# Patient Record
Sex: FEMALE | Race: WHITE | NOT HISPANIC OR LATINO
[De-identification: names, ages, dates, MRNs, and addresses within clinical notes are randomized per-mention and may not be internally consistent; named-entity substitution may affect disease eponyms.]

---

## 2017-01-12 ENCOUNTER — MEDICATION RENEWAL (OUTPATIENT)
Age: 82
End: 2017-01-12

## 2017-01-23 ENCOUNTER — MEDICATION RENEWAL (OUTPATIENT)
Age: 82
End: 2017-01-23

## 2017-01-26 ENCOUNTER — MEDICATION RENEWAL (OUTPATIENT)
Age: 82
End: 2017-01-26

## 2017-01-27 ENCOUNTER — MEDICATION RENEWAL (OUTPATIENT)
Age: 82
End: 2017-01-27

## 2017-03-07 ENCOUNTER — MEDICATION RENEWAL (OUTPATIENT)
Age: 82
End: 2017-03-07

## 2017-05-25 ENCOUNTER — APPOINTMENT (OUTPATIENT)
Dept: INTERNAL MEDICINE | Facility: CLINIC | Age: 82
End: 2017-05-25

## 2017-05-25 VITALS
BODY MASS INDEX: 29.04 KG/M2 | OXYGEN SATURATION: 94 % | TEMPERATURE: 98.3 F | HEIGHT: 63.75 IN | HEART RATE: 76 BPM | WEIGHT: 168 LBS

## 2017-05-25 VITALS — SYSTOLIC BLOOD PRESSURE: 120 MMHG | DIASTOLIC BLOOD PRESSURE: 60 MMHG

## 2017-06-01 LAB
25(OH)D3 SERPL-MCNC: 30.6 NG/ML
ALBUMIN SERPL ELPH-MCNC: 4.3 G/DL
ALP BLD-CCNC: 60 U/L
ALT SERPL-CCNC: 15 U/L
ANION GAP SERPL CALC-SCNC: 18 MMOL/L
AST SERPL-CCNC: 21 U/L
BASOPHILS # BLD AUTO: 0.02 K/UL
BASOPHILS NFR BLD AUTO: 0.3 %
BILIRUB SERPL-MCNC: 0.3 MG/DL
BUN SERPL-MCNC: 35 MG/DL
CALCIUM SERPL-MCNC: 10.6 MG/DL
CHLORIDE SERPL-SCNC: 103 MMOL/L
CHOLEST SERPL-MCNC: 192 MG/DL
CHOLEST/HDLC SERPL: 3.6 RATIO
CO2 SERPL-SCNC: 18 MMOL/L
CREAT SERPL-MCNC: 1.63 MG/DL
EOSINOPHIL # BLD AUTO: 0.25 K/UL
EOSINOPHIL NFR BLD AUTO: 4.3 %
GLUCOSE SERPL-MCNC: 105 MG/DL
HBA1C MFR BLD HPLC: 6.2 %
HCT VFR BLD CALC: 38.6 %
HDLC SERPL-MCNC: 53 MG/DL
HGB BLD-MCNC: 11.6 G/DL
IMM GRANULOCYTES NFR BLD AUTO: 0.2 %
LDLC SERPL CALC-MCNC: 99 MG/DL
LYMPHOCYTES # BLD AUTO: 2.19 K/UL
LYMPHOCYTES NFR BLD AUTO: 37.7 %
MAN DIFF?: NORMAL
MCHC RBC-ENTMCNC: 26.9 PG
MCHC RBC-ENTMCNC: 30.1 GM/DL
MCV RBC AUTO: 89.4 FL
MONOCYTES # BLD AUTO: 0.64 K/UL
MONOCYTES NFR BLD AUTO: 11 %
NEUTROPHILS # BLD AUTO: 2.7 K/UL
NEUTROPHILS NFR BLD AUTO: 46.5 %
PLATELET # BLD AUTO: 249 K/UL
POTASSIUM SERPL-SCNC: 4.9 MMOL/L
PROT SERPL-MCNC: 6.9 G/DL
RBC # BLD: 4.32 M/UL
RBC # FLD: 15 %
SODIUM SERPL-SCNC: 139 MMOL/L
TRIGL SERPL-MCNC: 200 MG/DL
WBC # FLD AUTO: 5.81 K/UL

## 2017-06-27 ENCOUNTER — APPOINTMENT (OUTPATIENT)
Dept: ENDOCRINOLOGY | Facility: CLINIC | Age: 82
End: 2017-06-27

## 2017-06-27 VITALS
BODY MASS INDEX: 28.69 KG/M2 | OXYGEN SATURATION: 94 % | SYSTOLIC BLOOD PRESSURE: 120 MMHG | DIASTOLIC BLOOD PRESSURE: 64 MMHG | HEIGHT: 63.75 IN | HEART RATE: 77 BPM | WEIGHT: 166 LBS

## 2017-06-27 RX ORDER — DENOSUMAB 60 MG/ML
60 INJECTION SUBCUTANEOUS
Qty: 1 | Refills: 0 | Status: COMPLETED | OUTPATIENT
Start: 2017-06-27

## 2017-06-27 RX ADMIN — DENOSUMAB 0 MG/ML: 60 INJECTION SUBCUTANEOUS at 00:00

## 2017-07-06 ENCOUNTER — RX RENEWAL (OUTPATIENT)
Age: 82
End: 2017-07-06

## 2017-07-17 ENCOUNTER — MESSAGE (OUTPATIENT)
Age: 82
End: 2017-07-17

## 2017-07-17 ENCOUNTER — MEDICATION RENEWAL (OUTPATIENT)
Age: 82
End: 2017-07-17

## 2017-07-25 ENCOUNTER — FORM ENCOUNTER (OUTPATIENT)
Age: 82
End: 2017-07-25

## 2017-07-26 ENCOUNTER — APPOINTMENT (OUTPATIENT)
Dept: ULTRASOUND IMAGING | Facility: IMAGING CENTER | Age: 82
End: 2017-07-26

## 2017-07-26 ENCOUNTER — OUTPATIENT (OUTPATIENT)
Dept: OUTPATIENT SERVICES | Facility: HOSPITAL | Age: 82
LOS: 1 days | End: 2017-07-26
Payer: MEDICARE

## 2017-07-26 DIAGNOSIS — C73 MALIGNANT NEOPLASM OF THYROID GLAND: ICD-10-CM

## 2017-07-26 PROCEDURE — 76536 US EXAM OF HEAD AND NECK: CPT

## 2017-08-17 ENCOUNTER — RX RENEWAL (OUTPATIENT)
Age: 82
End: 2017-08-17

## 2017-08-30 ENCOUNTER — MEDICATION RENEWAL (OUTPATIENT)
Age: 82
End: 2017-08-30

## 2017-09-05 ENCOUNTER — MEDICATION RENEWAL (OUTPATIENT)
Age: 82
End: 2017-09-05

## 2017-09-14 ENCOUNTER — RESULT REVIEW (OUTPATIENT)
Age: 82
End: 2017-09-14

## 2017-09-23 ENCOUNTER — RX RENEWAL (OUTPATIENT)
Age: 82
End: 2017-09-23

## 2017-10-27 ENCOUNTER — APPOINTMENT (OUTPATIENT)
Dept: INTERNAL MEDICINE | Facility: CLINIC | Age: 82
End: 2017-10-27
Payer: MEDICARE

## 2017-10-27 DIAGNOSIS — Z92.29 PERSONAL HISTORY OF OTHER DRUG THERAPY: ICD-10-CM

## 2017-10-27 DIAGNOSIS — Z87.898 PERSONAL HISTORY OF OTHER SPECIFIED CONDITIONS: ICD-10-CM

## 2017-10-27 PROCEDURE — G0008: CPT

## 2017-10-27 PROCEDURE — 90662 IIV NO PRSV INCREASED AG IM: CPT

## 2017-11-14 ENCOUNTER — APPOINTMENT (OUTPATIENT)
Dept: INTERNAL MEDICINE | Facility: CLINIC | Age: 82
End: 2017-11-14
Payer: MEDICARE

## 2017-11-14 VITALS
TEMPERATURE: 98.1 F | HEART RATE: 79 BPM | WEIGHT: 166 LBS | DIASTOLIC BLOOD PRESSURE: 70 MMHG | SYSTOLIC BLOOD PRESSURE: 122 MMHG | OXYGEN SATURATION: 95 % | HEIGHT: 63.75 IN | BODY MASS INDEX: 28.69 KG/M2

## 2017-11-14 PROCEDURE — G0009: CPT

## 2017-11-14 PROCEDURE — 36415 COLL VENOUS BLD VENIPUNCTURE: CPT

## 2017-11-14 PROCEDURE — 99214 OFFICE O/P EST MOD 30 MIN: CPT | Mod: 25

## 2017-11-14 PROCEDURE — 90670 PCV13 VACCINE IM: CPT

## 2017-11-30 VITALS — DIASTOLIC BLOOD PRESSURE: 55 MMHG | SYSTOLIC BLOOD PRESSURE: 132 MMHG

## 2017-11-30 LAB
ALBUMIN SERPL ELPH-MCNC: 4 G/DL
ALP BLD-CCNC: 63 U/L
ALT SERPL-CCNC: 12 U/L
ANION GAP SERPL CALC-SCNC: 14 MMOL/L
AST SERPL-CCNC: 16 U/L
BILIRUB SERPL-MCNC: 0.3 MG/DL
BUN SERPL-MCNC: 38 MG/DL
CALCIUM SERPL-MCNC: 10.8 MG/DL
CHLORIDE SERPL-SCNC: 104 MMOL/L
CHOLEST SERPL-MCNC: 190 MG/DL
CHOLEST/HDLC SERPL: 4.5 RATIO
CO2 SERPL-SCNC: 22 MMOL/L
CREAT SERPL-MCNC: 1.8 MG/DL
GLUCOSE SERPL-MCNC: 114 MG/DL
HBA1C MFR BLD HPLC: 5.8 %
HDLC SERPL-MCNC: 42 MG/DL
LDLC SERPL CALC-MCNC: 70 MG/DL
POTASSIUM SERPL-SCNC: 4.6 MMOL/L
PROT SERPL-MCNC: 7.2 G/DL
SODIUM SERPL-SCNC: 140 MMOL/L
TRIGL SERPL-MCNC: 390 MG/DL

## 2017-12-06 ENCOUNTER — MEDICATION RENEWAL (OUTPATIENT)
Age: 82
End: 2017-12-06

## 2017-12-08 ENCOUNTER — MEDICATION RENEWAL (OUTPATIENT)
Age: 82
End: 2017-12-08

## 2017-12-12 ENCOUNTER — MEDICATION RENEWAL (OUTPATIENT)
Age: 82
End: 2017-12-12

## 2017-12-13 ENCOUNTER — FORM ENCOUNTER (OUTPATIENT)
Age: 82
End: 2017-12-13

## 2017-12-14 ENCOUNTER — OUTPATIENT (OUTPATIENT)
Dept: OUTPATIENT SERVICES | Facility: HOSPITAL | Age: 82
LOS: 1 days | End: 2017-12-14
Payer: MEDICARE

## 2017-12-14 ENCOUNTER — APPOINTMENT (OUTPATIENT)
Dept: ULTRASOUND IMAGING | Facility: CLINIC | Age: 82
End: 2017-12-14
Payer: MEDICARE

## 2017-12-14 DIAGNOSIS — Z00.8 ENCOUNTER FOR OTHER GENERAL EXAMINATION: ICD-10-CM

## 2017-12-14 DIAGNOSIS — E04.2 NONTOXIC MULTINODULAR GOITER: ICD-10-CM

## 2017-12-14 PROCEDURE — 76536 US EXAM OF HEAD AND NECK: CPT

## 2017-12-14 PROCEDURE — 76536 US EXAM OF HEAD AND NECK: CPT | Mod: 26

## 2017-12-19 ENCOUNTER — RX RENEWAL (OUTPATIENT)
Age: 82
End: 2017-12-19

## 2017-12-28 ENCOUNTER — LABORATORY RESULT (OUTPATIENT)
Age: 82
End: 2017-12-28

## 2017-12-28 ENCOUNTER — APPOINTMENT (OUTPATIENT)
Dept: ENDOCRINOLOGY | Facility: CLINIC | Age: 82
End: 2017-12-28
Payer: MEDICARE

## 2017-12-28 VITALS
DIASTOLIC BLOOD PRESSURE: 70 MMHG | OXYGEN SATURATION: 95 % | WEIGHT: 163 LBS | HEIGHT: 63.75 IN | SYSTOLIC BLOOD PRESSURE: 130 MMHG | HEART RATE: 84 BPM | BODY MASS INDEX: 28.17 KG/M2

## 2017-12-28 PROCEDURE — 96372 THER/PROPH/DIAG INJ SC/IM: CPT

## 2017-12-28 PROCEDURE — 99214 OFFICE O/P EST MOD 30 MIN: CPT | Mod: 25

## 2017-12-28 RX ORDER — DENOSUMAB 60 MG/ML
60 INJECTION SUBCUTANEOUS
Qty: 0 | Refills: 0 | Status: COMPLETED | OUTPATIENT
Start: 2017-12-28

## 2017-12-28 RX ADMIN — DENOSUMAB 0 MG/ML: 60 INJECTION SUBCUTANEOUS at 00:00

## 2017-12-29 LAB
25(OH)D3 SERPL-MCNC: 41.3 NG/ML
ALBUMIN SERPL ELPH-MCNC: 4.2 G/DL
ALP BLD-CCNC: 66 U/L
ALT SERPL-CCNC: 12 U/L
ANION GAP SERPL CALC-SCNC: 14 MMOL/L
AST SERPL-CCNC: 14 U/L
BILIRUB SERPL-MCNC: 0.2 MG/DL
BUN SERPL-MCNC: 43 MG/DL
CALCIUM SERPL-MCNC: 11 MG/DL
CALCIUM SERPL-MCNC: 11 MG/DL
CHLORIDE SERPL-SCNC: 101 MMOL/L
CO2 SERPL-SCNC: 25 MMOL/L
CREAT SERPL-MCNC: 1.59 MG/DL
GLUCOSE SERPL-MCNC: 88 MG/DL
PARATHYROID HORMONE INTACT: 147 PG/ML
POTASSIUM SERPL-SCNC: 4.6 MMOL/L
PROT SERPL-MCNC: 7.1 G/DL
SODIUM SERPL-SCNC: 140 MMOL/L

## 2018-01-02 ENCOUNTER — RX RENEWAL (OUTPATIENT)
Age: 83
End: 2018-01-02

## 2018-01-03 LAB
T3RU NFR SERPL: 1 INDEX
T4 SERPL-MCNC: 8.9 UG/DL
THYROGLOB AB SERPL-ACNC: <20 IU/ML
THYROGLOB SERPL-MCNC: 6.97 NG/ML
TSH SERPL-ACNC: 2.4 UIU/ML

## 2018-04-17 ENCOUNTER — RX RENEWAL (OUTPATIENT)
Age: 83
End: 2018-04-17

## 2018-04-19 ENCOUNTER — MEDICATION RENEWAL (OUTPATIENT)
Age: 83
End: 2018-04-19

## 2018-06-19 ENCOUNTER — MEDICATION RENEWAL (OUTPATIENT)
Age: 83
End: 2018-06-19

## 2018-07-10 ENCOUNTER — APPOINTMENT (OUTPATIENT)
Dept: ENDOCRINOLOGY | Facility: CLINIC | Age: 83
End: 2018-07-10
Payer: MEDICARE

## 2018-07-10 VITALS
SYSTOLIC BLOOD PRESSURE: 128 MMHG | OXYGEN SATURATION: 95 % | BODY MASS INDEX: 27.65 KG/M2 | HEART RATE: 77 BPM | HEIGHT: 63.75 IN | DIASTOLIC BLOOD PRESSURE: 70 MMHG | WEIGHT: 160 LBS

## 2018-07-10 PROCEDURE — 96372 THER/PROPH/DIAG INJ SC/IM: CPT

## 2018-07-10 PROCEDURE — 99214 OFFICE O/P EST MOD 30 MIN: CPT | Mod: 25

## 2018-07-10 RX ORDER — DENOSUMAB 60 MG/ML
60 INJECTION SUBCUTANEOUS
Qty: 0 | Refills: 0 | Status: COMPLETED | OUTPATIENT
Start: 2018-07-10

## 2018-07-10 RX ADMIN — DENOSUMAB 0 MG/ML: 60 INJECTION SUBCUTANEOUS at 00:00

## 2018-07-17 LAB
25(OH)D3 SERPL-MCNC: 39.6 NG/ML
ALBUMIN SERPL ELPH-MCNC: 4.2 G/DL
ALP BLD-CCNC: 64 U/L
ALT SERPL-CCNC: 16 U/L
ANION GAP SERPL CALC-SCNC: 16 MMOL/L
AST SERPL-CCNC: 17 U/L
BILIRUB SERPL-MCNC: 0.2 MG/DL
BUN SERPL-MCNC: 47 MG/DL
CALCIUM SERPL-MCNC: 10.8 MG/DL
CALCIUM SERPL-MCNC: 10.8 MG/DL
CHLORIDE SERPL-SCNC: 102 MMOL/L
CO2 SERPL-SCNC: 21 MMOL/L
CREAT SERPL-MCNC: 1.78 MG/DL
GLUCOSE SERPL-MCNC: 94 MG/DL
PARATHYROID HORMONE INTACT: 134 PG/ML
POTASSIUM SERPL-SCNC: 4.7 MMOL/L
PROT SERPL-MCNC: 6.9 G/DL
SODIUM SERPL-SCNC: 139 MMOL/L

## 2018-08-22 ENCOUNTER — RX RENEWAL (OUTPATIENT)
Age: 83
End: 2018-08-22

## 2018-08-24 ENCOUNTER — APPOINTMENT (OUTPATIENT)
Dept: INTERNAL MEDICINE | Facility: CLINIC | Age: 83
End: 2018-08-24
Payer: MEDICARE

## 2018-08-24 VITALS
OXYGEN SATURATION: 92 % | TEMPERATURE: 97.7 F | HEART RATE: 80 BPM | HEIGHT: 63.25 IN | BODY MASS INDEX: 28.17 KG/M2 | WEIGHT: 161 LBS

## 2018-08-24 PROCEDURE — 90750 HZV VACC RECOMBINANT IM: CPT | Mod: GY

## 2018-08-24 PROCEDURE — 36415 COLL VENOUS BLD VENIPUNCTURE: CPT

## 2018-08-24 PROCEDURE — 90471 IMMUNIZATION ADMIN: CPT | Mod: GY

## 2018-08-24 PROCEDURE — G0439: CPT

## 2018-08-24 PROCEDURE — 93000 ELECTROCARDIOGRAM COMPLETE: CPT | Mod: 59

## 2018-08-25 ENCOUNTER — NON-APPOINTMENT (OUTPATIENT)
Age: 83
End: 2018-08-25

## 2018-08-25 VITALS — SYSTOLIC BLOOD PRESSURE: 120 MMHG | DIASTOLIC BLOOD PRESSURE: 65 MMHG

## 2018-08-25 LAB
APPEARANCE: CLEAR
BACTERIA: NEGATIVE
BASOPHILS # BLD AUTO: 0.03 K/UL
BASOPHILS NFR BLD AUTO: 0.7 %
BILIRUBIN URINE: NEGATIVE
BLOOD URINE: NEGATIVE
CHOLEST SERPL-MCNC: 176 MG/DL
CHOLEST/HDLC SERPL: 3.6 RATIO
COLOR: YELLOW
EOSINOPHIL # BLD AUTO: 0.22 K/UL
EOSINOPHIL NFR BLD AUTO: 5.2 %
GLUCOSE QUALITATIVE U: NEGATIVE MG/DL
HBA1C MFR BLD HPLC: 5.9 %
HCT VFR BLD CALC: 40.8 %
HDLC SERPL-MCNC: 49 MG/DL
HGB BLD-MCNC: 12.1 G/DL
HYALINE CASTS: 0 /LPF
IMM GRANULOCYTES NFR BLD AUTO: 0.2 %
KETONES URINE: NEGATIVE
LDLC SERPL CALC-MCNC: 101 MG/DL
LEUKOCYTE ESTERASE URINE: ABNORMAL
LYMPHOCYTES # BLD AUTO: 1.77 K/UL
LYMPHOCYTES NFR BLD AUTO: 41.9 %
MAN DIFF?: NORMAL
MCHC RBC-ENTMCNC: 27.4 PG
MCHC RBC-ENTMCNC: 29.7 GM/DL
MCV RBC AUTO: 92.5 FL
MICROSCOPIC-UA: NORMAL
MONOCYTES # BLD AUTO: 0.46 K/UL
MONOCYTES NFR BLD AUTO: 10.9 %
NEUTROPHILS # BLD AUTO: 1.73 K/UL
NEUTROPHILS NFR BLD AUTO: 41.1 %
NITRITE URINE: NEGATIVE
PH URINE: 6
PLATELET # BLD AUTO: 225 K/UL
PROTEIN URINE: NEGATIVE MG/DL
RBC # BLD: 4.41 M/UL
RBC # FLD: 15 %
RED BLOOD CELLS URINE: 0 /HPF
SPECIFIC GRAVITY URINE: 1.01
SQUAMOUS EPITHELIAL CELLS: 1 /HPF
T4 FREE SERPL-MCNC: 1.7 NG/DL
TRIGL SERPL-MCNC: 132 MG/DL
TSH SERPL-ACNC: 0.9 UIU/ML
UROBILINOGEN URINE: NEGATIVE MG/DL
WBC # FLD AUTO: 4.22 K/UL
WHITE BLOOD CELLS URINE: 6 /HPF

## 2018-08-25 NOTE — ASSESSMENT
[FreeTextEntry1] : The blood pressure is very good.  Discussed diet and exercise.\par \par Check lipids on Simvastatin.\par \par Check a HgBA1c- GIT.\par \par Check TFTs on Levothyroxine.\par \par Monitor renal function.\par \par She requests PT for her balance and conditioning.\par \par She declines a mammogram- ok given her age.  Also defer a colonoscopy.\par \par She sees her endocrinologist and osteoporosis managed there.\par \par She is doing well from a psychiatric standpoint.  Monitor.  \par \par S/p Zostavax, Prevnar, Pneumovax.  Shingrix today #1.

## 2018-08-25 NOTE — HEALTH RISK ASSESSMENT
[No falls in past year] : Patient reported no falls in the past year [0] : 2) Feeling down, depressed, or hopeless: Not at all (0) [Patient declined mammogram] : Patient declined mammogram [Patient declined colonoscopy] : Patient declined colonoscopy [Fully functional (bathing, dressing, toileting, transferring, walking, feeding)] : Fully functional (bathing, dressing, toileting, transferring, walking, feeding) [Fully functional (using the telephone, shopping, preparing meals, housekeeping, doing laundry, using] : Fully functional and needs no help or supervision to perform IADLs (using the telephone, shopping, preparing meals, housekeeping, doing laundry, using transportation, managing medications and managing finances) [] : No [[id number]] : 0 [Change in mental status noted] : No change in mental status noted [Reports changes in hearing] : Reports no changes in hearing [Reports changes in vision] : Reports no changes in vision [Reports changes in dental health] : Reports no changes in dental health

## 2018-08-25 NOTE — DATA REVIEWED
[FreeTextEntry1] : EKG:  NSR 60, PRWP, Q waves in 111, aVF, similar to EKGs over the last few years.

## 2018-08-25 NOTE — HISTORY OF PRESENT ILLNESS
[FreeTextEntry1] : The patient is here for a routine visit.  [de-identified] : The patient has a healthy diet.  She doesn't exercise.  No chest pain or dyspnea.\par \par She hasn't needed to see Dr. Royal and she feels well from a psychiatric standpoint.\par \par She saw Dr. Perlman recently for a sinusitis.\par \par She sees Dr. Mercado, last 7/18 for the osteoporosis (on Prolia) and thyroid.

## 2018-10-04 ENCOUNTER — RX RENEWAL (OUTPATIENT)
Age: 83
End: 2018-10-04

## 2018-11-07 ENCOUNTER — APPOINTMENT (OUTPATIENT)
Dept: INTERNAL MEDICINE | Facility: CLINIC | Age: 83
End: 2018-11-07
Payer: SELF-PAY

## 2018-11-07 PROCEDURE — 90750 HZV VACC RECOMBINANT IM: CPT

## 2018-11-07 PROCEDURE — 90471 IMMUNIZATION ADMIN: CPT

## 2018-11-15 ENCOUNTER — APPOINTMENT (OUTPATIENT)
Dept: INTERNAL MEDICINE | Facility: CLINIC | Age: 83
End: 2018-11-15
Payer: MEDICARE

## 2018-11-15 VITALS
TEMPERATURE: 97.6 F | HEART RATE: 89 BPM | HEIGHT: 63.25 IN | WEIGHT: 160 LBS | DIASTOLIC BLOOD PRESSURE: 50 MMHG | OXYGEN SATURATION: 94 % | BODY MASS INDEX: 28 KG/M2 | SYSTOLIC BLOOD PRESSURE: 110 MMHG

## 2018-11-15 VITALS — DIASTOLIC BLOOD PRESSURE: 65 MMHG | SYSTOLIC BLOOD PRESSURE: 125 MMHG

## 2018-11-15 PROCEDURE — 99213 OFFICE O/P EST LOW 20 MIN: CPT

## 2018-11-15 NOTE — HISTORY OF PRESENT ILLNESS
[de-identified] : The patient feels well.  She is here because she is going to stay at an assisted living facility over the winter in [place], [place], and they need a form completed.  \par \par She notes her balance isn't as good.  No falls. \par \par

## 2018-11-15 NOTE — ASSESSMENT
[FreeTextEntry1] : Form completed for the facility.  She is medically stable with no new issues. \par \par She has had the flu vaccine and both Shingrix vaccines.\par \par PT advised for her balance.\par \par Follow-up in six months or sooner PRN.

## 2018-12-03 ENCOUNTER — RX RENEWAL (OUTPATIENT)
Age: 83
End: 2018-12-03

## 2019-01-07 ENCOUNTER — MEDICATION RENEWAL (OUTPATIENT)
Age: 84
End: 2019-01-07

## 2019-01-08 ENCOUNTER — MEDICATION RENEWAL (OUTPATIENT)
Age: 84
End: 2019-01-08

## 2019-01-14 ENCOUNTER — LABORATORY RESULT (OUTPATIENT)
Age: 84
End: 2019-01-14

## 2019-01-14 ENCOUNTER — APPOINTMENT (OUTPATIENT)
Dept: ENDOCRINOLOGY | Facility: CLINIC | Age: 84
End: 2019-01-14
Payer: MEDICARE

## 2019-01-14 VITALS — HEIGHT: 63.6 IN | WEIGHT: 158 LBS | BODY MASS INDEX: 27.31 KG/M2

## 2019-01-14 VITALS — DIASTOLIC BLOOD PRESSURE: 68 MMHG | SYSTOLIC BLOOD PRESSURE: 122 MMHG | HEART RATE: 68 BPM | OXYGEN SATURATION: 94 %

## 2019-01-14 PROCEDURE — ZZZZZ: CPT

## 2019-01-14 PROCEDURE — 99214 OFFICE O/P EST MOD 30 MIN: CPT | Mod: 25

## 2019-01-14 PROCEDURE — 96372 THER/PROPH/DIAG INJ SC/IM: CPT

## 2019-01-14 PROCEDURE — 77080 DXA BONE DENSITY AXIAL: CPT | Mod: GA

## 2019-01-14 RX ORDER — DENOSUMAB 60 MG/ML
60 INJECTION SUBCUTANEOUS
Qty: 1 | Refills: 0 | Status: COMPLETED | OUTPATIENT
Start: 2019-01-14

## 2019-01-14 RX ADMIN — DENOSUMAB 0 MG/ML: 60 INJECTION SUBCUTANEOUS at 00:00

## 2019-01-14 NOTE — ASSESSMENT
[FreeTextEntry1] : 84 year old woman woman with osteoporosis, on Prolia tx x 7 years\par  - DXA performed today, pt with improved bone density at all sites\par   - Continue Prolia, administered today without complication [source:stock]\par  - will consider transitioning to bisphosphonate then giving drug holiday in the future\par   - continue vitamin D supplementation\par  - repeat dxa  in 2 years\par \par Hyperparathyroidism/hypercalcemia -    Continue to monitor with medical tx for osteoporosis for now\par  Check CMP.\par   Reviewed importance of maintainint adequate fluid intake\par \par Thyroid cancer, post-operative hypothyroidism, thyroid nodules\par  - Check TFTs and Tg and adjust levothyroxine dose as needed\par   - check thyroid ultrasound to monitor for change\par \par f/u in 6 months

## 2019-01-14 NOTE — PHYSICAL EXAM
[Alert] : alert [No Acute Distress] : no acute distress [Well Nourished] : well nourished [Well Developed] : well developed [Normal Sclera/Conjunctiva] : normal sclera/conjunctiva [No Proptosis] : no proptosis [No LAD] : no lymphadenopathy [Well Healed Scar] : well healed scar [No Respiratory Distress] : no respiratory distress [Clear to Auscultation] : lungs were clear to auscultation bilaterally [Normal S1, S2] : normal S1 and S2 [Regular Rhythm] : with a regular rhythm [No Edema] : there was no peripheral edema [Normal Bowel Sounds] : normal bowel sounds [Not Tender] : non-tender [Soft] : abdomen soft [No Spinal Tenderness] : no spinal tenderness [Normal Strength/Tone] : muscle strength and tone were normal [No Tremors] : no tremors [Normal Affect] : the affect was normal [Normal Mood] : the mood was normal

## 2019-01-14 NOTE — DATA REVIEWED
[FreeTextEntry1] : 12/2017\par Thyroid ultrasound - stable nodules\par \par DXA 2016\par T score  F neck -2.4; T hip -2.8, 1/3 rad -1.0\par \par 4/2015\par Stable b/l thyroid nodules

## 2019-01-14 NOTE — HISTORY OF PRESENT ILLNESS
[FreeTextEntry1] : cc: osteoporosis\par \par 84 year old woman with osteoporosis, on Prolia x 7 years, tolerating it well.  She reports no recent falls or fractures,     Consumes 1-2 servings dairy daily and takes supplemental vitamin D 2000 units daily. She has regular dental f/u, She has not had any thigh pain. \par \par She also has hyperparathyroidism with mild hypercalcemia -Drinks a lot of water throughout the day,  no kidney stones\par \par Also with thyroid cancer post hemithyroidectomy, with stable nodules in thyroidectomy bed.  She reports no  recent change in her neck, no dysphagia or dyspnea.  Last ultrasound showed stable nodules\par \par \par

## 2019-01-16 LAB
ALBUMIN SERPL ELPH-MCNC: 4.2 G/DL
ALP BLD-CCNC: 73 U/L
ALT SERPL-CCNC: 16 U/L
ANION GAP SERPL CALC-SCNC: 13 MMOL/L
AST SERPL-CCNC: 15 U/L
BILIRUB SERPL-MCNC: 0.3 MG/DL
BUN SERPL-MCNC: 35 MG/DL
CALCIUM SERPL-MCNC: 11.2 MG/DL
CHLORIDE SERPL-SCNC: 105 MMOL/L
CO2 SERPL-SCNC: 23 MMOL/L
CREAT SERPL-MCNC: 1.51 MG/DL
GLUCOSE SERPL-MCNC: 97 MG/DL
POTASSIUM SERPL-SCNC: 4.6 MMOL/L
PROT SERPL-MCNC: 6.6 G/DL
SODIUM SERPL-SCNC: 141 MMOL/L
T3RU NFR SERPL: 0.95 INDEX
T4 SERPL-MCNC: 8.7 UG/DL
THYROGLOB AB SERPL-ACNC: <20 IU/ML
THYROGLOB SERPL-MCNC: 5.92 NG/ML
TSH SERPL-ACNC: 0.53 UIU/ML

## 2019-04-19 ENCOUNTER — MEDICATION RENEWAL (OUTPATIENT)
Age: 84
End: 2019-04-19

## 2019-04-24 ENCOUNTER — MEDICATION RENEWAL (OUTPATIENT)
Age: 84
End: 2019-04-24

## 2019-05-14 ENCOUNTER — RX RENEWAL (OUTPATIENT)
Age: 84
End: 2019-05-14

## 2019-06-07 ENCOUNTER — MEDICATION RENEWAL (OUTPATIENT)
Age: 84
End: 2019-06-07

## 2019-06-17 ENCOUNTER — APPOINTMENT (OUTPATIENT)
Dept: INTERNAL MEDICINE | Facility: CLINIC | Age: 84
End: 2019-06-17
Payer: MEDICARE

## 2019-06-17 VITALS
WEIGHT: 161 LBS | OXYGEN SATURATION: 92 % | TEMPERATURE: 98 F | BODY MASS INDEX: 27.83 KG/M2 | HEIGHT: 63.6 IN | HEART RATE: 75 BPM

## 2019-06-17 VITALS — SYSTOLIC BLOOD PRESSURE: 135 MMHG | DIASTOLIC BLOOD PRESSURE: 70 MMHG

## 2019-06-17 PROCEDURE — 99214 OFFICE O/P EST MOD 30 MIN: CPT | Mod: 25

## 2019-06-17 PROCEDURE — 36415 COLL VENOUS BLD VENIPUNCTURE: CPT

## 2019-06-17 NOTE — HISTORY OF PRESENT ILLNESS
[de-identified] : The patient is here for a routine visit after returning from [place].  She had a good winter which was uneventful.  Her appetite is good.  No chest pain or dyspnea.  She doesn't walk much.  Balance is the same.  No falls.  Diet is fairly good.

## 2019-06-17 NOTE — PHYSICAL EXAM
[No Acute Distress] : no acute distress [Well Nourished] : well nourished [Well Developed] : well developed [Clear to Auscultation] : lungs were clear to auscultation bilaterally [No Respiratory Distress] : no respiratory distress  [No Accessory Muscle Use] : no accessory muscle use [Normal Rate] : normal rate  [Regular Rhythm] : with a regular rhythm [Normal S1, S2] : normal S1 and S2 [Pedal Pulses Present] : the pedal pulses are present [No Edema] : there was no peripheral edema [Non Tender] : non-tender [Soft] : abdomen soft [Non-distended] : non-distended [No HSM] : no HSM [Coordination Grossly Intact] : coordination grossly intact [No Focal Deficits] : no focal deficits

## 2019-06-17 NOTE — ASSESSMENT
[FreeTextEntry1] : She is medically stable with no new issues.  Discussed diet and exercise.\par \par The BP is ok at 135/70.\par \par Monitor calcium.\par \par Check lipids on Simvastatin.\par \par TFTs checked by Dr. Mercado.\par \par Monitor HgBA1c- last 5.9.\par \par Monitor renal function.\par \par She would like to see a new psychiatrist and she was given names.

## 2019-06-19 LAB
ALBUMIN SERPL ELPH-MCNC: 4.4 G/DL
ALP BLD-CCNC: 69 U/L
ALT SERPL-CCNC: 15 U/L
ANION GAP SERPL CALC-SCNC: 11 MMOL/L
AST SERPL-CCNC: 14 U/L
BILIRUB SERPL-MCNC: 0.3 MG/DL
BUN SERPL-MCNC: 35 MG/DL
CALCIUM SERPL-MCNC: 10.8 MG/DL
CHLORIDE SERPL-SCNC: 107 MMOL/L
CHOLEST SERPL-MCNC: 175 MG/DL
CHOLEST/HDLC SERPL: 3.5 RATIO
CO2 SERPL-SCNC: 21 MMOL/L
CREAT SERPL-MCNC: 1.73 MG/DL
ESTIMATED AVERAGE GLUCOSE: 123 MG/DL
GLUCOSE SERPL-MCNC: 102 MG/DL
HBA1C MFR BLD HPLC: 5.9 %
HDLC SERPL-MCNC: 50 MG/DL
LDLC SERPL CALC-MCNC: 88 MG/DL
POTASSIUM SERPL-SCNC: 4.6 MMOL/L
PROT SERPL-MCNC: 6.8 G/DL
SODIUM SERPL-SCNC: 139 MMOL/L
TRIGL SERPL-MCNC: 184 MG/DL

## 2019-07-15 ENCOUNTER — APPOINTMENT (OUTPATIENT)
Dept: ENDOCRINOLOGY | Facility: CLINIC | Age: 84
End: 2019-07-15
Payer: MEDICARE

## 2019-07-15 VITALS
DIASTOLIC BLOOD PRESSURE: 60 MMHG | BODY MASS INDEX: 27.31 KG/M2 | OXYGEN SATURATION: 97 % | SYSTOLIC BLOOD PRESSURE: 130 MMHG | HEART RATE: 69 BPM | WEIGHT: 158 LBS | HEIGHT: 63.6 IN

## 2019-07-15 PROCEDURE — 96372 THER/PROPH/DIAG INJ SC/IM: CPT

## 2019-07-15 PROCEDURE — 99214 OFFICE O/P EST MOD 30 MIN: CPT | Mod: 25

## 2019-07-15 RX ORDER — DENOSUMAB 60 MG/ML
60 INJECTION SUBCUTANEOUS
Qty: 1 | Refills: 0 | Status: COMPLETED | OUTPATIENT
Start: 2019-07-15

## 2019-07-15 RX ADMIN — DENOSUMAB 0 MG/ML: 60 INJECTION SUBCUTANEOUS at 00:00

## 2019-07-16 ENCOUNTER — FORM ENCOUNTER (OUTPATIENT)
Age: 84
End: 2019-07-16

## 2019-07-17 ENCOUNTER — OUTPATIENT (OUTPATIENT)
Dept: OUTPATIENT SERVICES | Facility: HOSPITAL | Age: 84
LOS: 1 days | End: 2019-07-17
Payer: MEDICARE

## 2019-07-17 ENCOUNTER — APPOINTMENT (OUTPATIENT)
Dept: ULTRASOUND IMAGING | Facility: CLINIC | Age: 84
End: 2019-07-17
Payer: MEDICARE

## 2019-07-17 DIAGNOSIS — Z00.8 ENCOUNTER FOR OTHER GENERAL EXAMINATION: ICD-10-CM

## 2019-07-17 PROCEDURE — 76536 US EXAM OF HEAD AND NECK: CPT | Mod: 26

## 2019-07-17 PROCEDURE — 76536 US EXAM OF HEAD AND NECK: CPT

## 2019-07-21 ENCOUNTER — RX RENEWAL (OUTPATIENT)
Age: 84
End: 2019-07-21

## 2019-07-24 ENCOUNTER — RX RENEWAL (OUTPATIENT)
Age: 84
End: 2019-07-24

## 2019-07-25 ENCOUNTER — MEDICATION RENEWAL (OUTPATIENT)
Age: 84
End: 2019-07-25

## 2019-07-29 ENCOUNTER — APPOINTMENT (OUTPATIENT)
Dept: PSYCHIATRY | Facility: CLINIC | Age: 84
End: 2019-07-29
Payer: MEDICARE

## 2019-07-29 PROCEDURE — 99205 OFFICE O/P NEW HI 60 MIN: CPT

## 2019-08-29 ENCOUNTER — APPOINTMENT (OUTPATIENT)
Dept: PSYCHIATRY | Facility: CLINIC | Age: 84
End: 2019-08-29
Payer: MEDICARE

## 2019-08-29 PROCEDURE — 99214 OFFICE O/P EST MOD 30 MIN: CPT

## 2019-09-05 ENCOUNTER — NON-APPOINTMENT (OUTPATIENT)
Age: 84
End: 2019-09-05

## 2019-09-05 ENCOUNTER — APPOINTMENT (OUTPATIENT)
Dept: INTERNAL MEDICINE | Facility: CLINIC | Age: 84
End: 2019-09-05
Payer: MEDICARE

## 2019-09-05 VITALS
OXYGEN SATURATION: 91 % | WEIGHT: 164 LBS | TEMPERATURE: 97.5 F | HEART RATE: 110 BPM | HEIGHT: 63 IN | BODY MASS INDEX: 29.06 KG/M2

## 2019-09-05 PROCEDURE — G0444 DEPRESSION SCREEN ANNUAL: CPT | Mod: 59

## 2019-09-05 PROCEDURE — G0439: CPT

## 2019-09-05 PROCEDURE — 93000 ELECTROCARDIOGRAM COMPLETE: CPT | Mod: 59

## 2019-09-05 PROCEDURE — 36415 COLL VENOUS BLD VENIPUNCTURE: CPT

## 2019-09-06 ENCOUNTER — APPOINTMENT (OUTPATIENT)
Dept: INTERNAL MEDICINE | Facility: CLINIC | Age: 84
End: 2019-09-06

## 2019-09-27 ENCOUNTER — APPOINTMENT (OUTPATIENT)
Dept: PSYCHIATRY | Facility: CLINIC | Age: 84
End: 2019-09-27
Payer: MEDICARE

## 2019-09-27 PROCEDURE — 90791 PSYCH DIAGNOSTIC EVALUATION: CPT

## 2019-09-28 ENCOUNTER — RX RENEWAL (OUTPATIENT)
Age: 84
End: 2019-09-28

## 2019-10-04 ENCOUNTER — APPOINTMENT (OUTPATIENT)
Dept: PSYCHIATRY | Facility: CLINIC | Age: 84
End: 2019-10-04

## 2019-10-10 ENCOUNTER — APPOINTMENT (OUTPATIENT)
Dept: PSYCHIATRY | Facility: CLINIC | Age: 84
End: 2019-10-10
Payer: MEDICARE

## 2019-10-10 PROCEDURE — 99214 OFFICE O/P EST MOD 30 MIN: CPT

## 2019-10-11 ENCOUNTER — APPOINTMENT (OUTPATIENT)
Dept: PSYCHIATRY | Facility: CLINIC | Age: 84
End: 2019-10-11

## 2019-10-18 ENCOUNTER — APPOINTMENT (OUTPATIENT)
Dept: PSYCHIATRY | Facility: CLINIC | Age: 84
End: 2019-10-18

## 2019-10-18 ENCOUNTER — APPOINTMENT (OUTPATIENT)
Dept: PSYCHIATRY | Facility: CLINIC | Age: 84
End: 2019-10-18
Payer: MEDICARE

## 2019-10-18 DIAGNOSIS — F32.9 MAJOR DEPRESSIVE DISORDER, SINGLE EPISODE, UNSPECIFIED: ICD-10-CM

## 2019-10-18 DIAGNOSIS — F41.9 ANXIETY DISORDER, UNSPECIFIED: ICD-10-CM

## 2019-10-18 PROCEDURE — 90837 PSYTX W PT 60 MINUTES: CPT

## 2019-10-23 PROBLEM — F32.9 DEPRESSION: Status: ACTIVE | Noted: 2019-10-02

## 2019-12-03 ENCOUNTER — RX RENEWAL (OUTPATIENT)
Age: 84
End: 2019-12-03

## 2019-12-05 ENCOUNTER — APPOINTMENT (OUTPATIENT)
Dept: PSYCHIATRY | Facility: CLINIC | Age: 84
End: 2019-12-05
Payer: MEDICARE

## 2019-12-05 PROCEDURE — 99214 OFFICE O/P EST MOD 30 MIN: CPT

## 2019-12-29 LAB
25(OH)D3 SERPL-MCNC: 29.4 NG/ML
ALBUMIN SERPL ELPH-MCNC: 4.3 G/DL
ALP BLD-CCNC: 65 U/L
ALT SERPL-CCNC: 21 U/L
ANION GAP SERPL CALC-SCNC: 13 MMOL/L
APPEARANCE: CLEAR
AST SERPL-CCNC: 18 U/L
BACTERIA: NEGATIVE
BASOPHILS # BLD AUTO: 0.04 K/UL
BASOPHILS NFR BLD AUTO: 0.7 %
BILIRUB SERPL-MCNC: 0.3 MG/DL
BILIRUBIN URINE: NEGATIVE
BLOOD URINE: NEGATIVE
BUN SERPL-MCNC: 38 MG/DL
CALCIUM SERPL-MCNC: 10.4 MG/DL
CHLORIDE SERPL-SCNC: 107 MMOL/L
CHOLEST SERPL-MCNC: 175 MG/DL
CHOLEST/HDLC SERPL: 3.6 RATIO
CO2 SERPL-SCNC: 18 MMOL/L
COLOR: NORMAL
CREAT SERPL-MCNC: 1.8 MG/DL
EOSINOPHIL # BLD AUTO: 0.22 K/UL
EOSINOPHIL NFR BLD AUTO: 4 %
ESTIMATED AVERAGE GLUCOSE: 111 MG/DL
GLUCOSE QUALITATIVE U: NEGATIVE
GLUCOSE SERPL-MCNC: 118 MG/DL
HBA1C MFR BLD HPLC: 5.5 %
HCT VFR BLD CALC: 39.5 %
HDLC SERPL-MCNC: 49 MG/DL
HGB BLD-MCNC: 11.9 G/DL
HYALINE CASTS: 2 /LPF
IMM GRANULOCYTES NFR BLD AUTO: 0.2 %
KETONES URINE: NEGATIVE
LDLC SERPL CALC-MCNC: 76 MG/DL
LEUKOCYTE ESTERASE URINE: ABNORMAL
LYMPHOCYTES # BLD AUTO: 1.92 K/UL
LYMPHOCYTES NFR BLD AUTO: 34.7 %
MAN DIFF?: NORMAL
MCHC RBC-ENTMCNC: 28 PG
MCHC RBC-ENTMCNC: 30.1 GM/DL
MCV RBC AUTO: 92.9 FL
MICROSCOPIC-UA: NORMAL
MONOCYTES # BLD AUTO: 0.58 K/UL
MONOCYTES NFR BLD AUTO: 10.5 %
NEUTROPHILS # BLD AUTO: 2.76 K/UL
NEUTROPHILS NFR BLD AUTO: 49.9 %
NITRITE URINE: NEGATIVE
PH URINE: 6
PLATELET # BLD AUTO: 248 K/UL
POTASSIUM SERPL-SCNC: 4.6 MMOL/L
PROT SERPL-MCNC: 6.6 G/DL
PROTEIN URINE: NEGATIVE
RBC # BLD: 4.25 M/UL
RBC # FLD: 14.2 %
RED BLOOD CELLS URINE: 0 /HPF
SODIUM SERPL-SCNC: 138 MMOL/L
SPECIFIC GRAVITY URINE: 1.01
SQUAMOUS EPITHELIAL CELLS: 0 /HPF
T4 FREE SERPL-MCNC: 1.4 NG/DL
TRIGL SERPL-MCNC: 251 MG/DL
TSH SERPL-ACNC: 0.71 UIU/ML
UROBILINOGEN URINE: NORMAL
WBC # FLD AUTO: 5.53 K/UL
WHITE BLOOD CELLS URINE: 4 /HPF

## 2019-12-29 NOTE — HISTORY OF PRESENT ILLNESS
[FreeTextEntry1] : The patient is here for a routine visit.  [de-identified] : Her appetite and diet are good.  She doesn't exercise.  No chest pain or dyspnea.  \par \par She sees a psychiatrist and she is happy with her and is stable.

## 2019-12-29 NOTE — ASSESSMENT
[FreeTextEntry1] : The BP is good.  Discussed diet and exercise.\par \par Check lipids on Simvastatin.\par \par Check a HgBA1c- last 5.9.\par \par Check TFTs on Levothyroxine.  \par \par Monitor calcium.\par \par She wants to defer a mammogram.\par \par Doing well from a psychiatric standpoint.\par \par S/p Prevnar, Pneumovax, Shingrix.\par \par Defer colonoscopy.  \par \par She sees Dr. Mercado and is on Prolia.

## 2019-12-29 NOTE — PHYSICAL EXAM
[No Acute Distress] : no acute distress [Well Nourished] : well nourished [Well Developed] : well developed [Well-Appearing] : well-appearing [Normal Sclera/Conjunctiva] : normal sclera/conjunctiva [PERRL] : pupils equal round and reactive to light [EOMI] : extraocular movements intact [Normal Outer Ear/Nose] : the outer ears and nose were normal in appearance [Normal Oropharynx] : the oropharynx was normal [No JVD] : no jugular venous distention [No Lymphadenopathy] : no lymphadenopathy [Supple] : supple [Thyroid Normal, No Nodules] : the thyroid was normal and there were no nodules present [No Respiratory Distress] : no respiratory distress  [No Accessory Muscle Use] : no accessory muscle use [Clear to Auscultation] : lungs were clear to auscultation bilaterally [Normal Rate] : normal rate  [Regular Rhythm] : with a regular rhythm [Normal S1, S2] : normal S1 and S2 [No Murmur] : no murmur heard [No Carotid Bruits] : no carotid bruits [No Abdominal Bruit] : a ~M bruit was not heard ~T in the abdomen [Pedal Pulses Present] : the pedal pulses are present [No Varicosities] : no varicosities [No Edema] : there was no peripheral edema [No Palpable Aorta] : no palpable aorta [No Extremity Clubbing/Cyanosis] : no extremity clubbing/cyanosis [Normal Appearance] : normal in appearance [No Nipple Discharge] : no nipple discharge [No Axillary Lymphadenopathy] : no axillary lymphadenopathy [Non Tender] : non-tender [Soft] : abdomen soft [Non-distended] : non-distended [No Masses] : no abdominal mass palpated [No HSM] : no HSM [Normal Bowel Sounds] : normal bowel sounds [Normal Posterior Cervical Nodes] : no posterior cervical lymphadenopathy [Normal Anterior Cervical Nodes] : no anterior cervical lymphadenopathy [No Spinal Tenderness] : no spinal tenderness [No CVA Tenderness] : no CVA  tenderness [No Joint Swelling] : no joint swelling [Grossly Normal Strength/Tone] : grossly normal strength/tone [No Rash] : no rash [Coordination Grossly Intact] : coordination grossly intact [No Focal Deficits] : no focal deficits [Normal Gait] : normal gait [Deep Tendon Reflexes (DTR)] : deep tendon reflexes were 2+ and symmetric [Normal Affect] : the affect was normal [Normal Insight/Judgement] : insight and judgment were intact [de-identified] : 124/60

## 2020-01-14 ENCOUNTER — APPOINTMENT (OUTPATIENT)
Dept: PSYCHIATRY | Facility: CLINIC | Age: 85
End: 2020-01-14
Payer: MEDICARE

## 2020-01-14 PROCEDURE — 99214 OFFICE O/P EST MOD 30 MIN: CPT

## 2020-01-16 ENCOUNTER — LABORATORY RESULT (OUTPATIENT)
Age: 85
End: 2020-01-16

## 2020-01-16 ENCOUNTER — APPOINTMENT (OUTPATIENT)
Dept: ENDOCRINOLOGY | Facility: CLINIC | Age: 85
End: 2020-01-16
Payer: MEDICARE

## 2020-01-16 VITALS
HEART RATE: 87 BPM | SYSTOLIC BLOOD PRESSURE: 132 MMHG | BODY MASS INDEX: 28.35 KG/M2 | WEIGHT: 160 LBS | DIASTOLIC BLOOD PRESSURE: 50 MMHG | OXYGEN SATURATION: 94 % | HEIGHT: 63 IN

## 2020-01-16 PROCEDURE — 96372 THER/PROPH/DIAG INJ SC/IM: CPT

## 2020-01-16 PROCEDURE — 99214 OFFICE O/P EST MOD 30 MIN: CPT | Mod: 25

## 2020-01-16 RX ORDER — DENOSUMAB 60 MG/ML
60 INJECTION SUBCUTANEOUS
Qty: 1 | Refills: 0 | Status: COMPLETED | OUTPATIENT
Start: 2020-01-16

## 2020-01-16 RX ADMIN — DENOSUMAB 0 MG/ML: 60 INJECTION SUBCUTANEOUS at 00:00

## 2020-01-16 NOTE — PHYSICAL EXAM
[Alert] : alert [No Acute Distress] : no acute distress [Well Nourished] : well nourished [Well Developed] : well developed [No Proptosis] : no proptosis [Normal Sclera/Conjunctiva] : normal sclera/conjunctiva [Thyroid Not Enlarged] : the thyroid was not enlarged [Well Healed Scar] : well healed scar [Clear to Auscultation] : lungs were clear to auscultation bilaterally [No Respiratory Distress] : no respiratory distress [Normal S1, S2] : normal S1 and S2 [Regular Rhythm] : with a regular rhythm [No Edema] : there was no peripheral edema [Normal Bowel Sounds] : normal bowel sounds [Soft] : abdomen soft [Kyphosis] : kyphosis present [Not Tender] : non-tender [Normal Strength/Tone] : muscle strength and tone were normal [No Tremors] : no tremors [Normal Reflexes] : deep tendon reflexes were 2+ and symmetric [Normal Mood] : the mood was normal [Normal Affect] : the affect was normal

## 2020-01-17 ENCOUNTER — CLINICAL ADVICE (OUTPATIENT)
Age: 85
End: 2020-01-17

## 2020-01-17 LAB
ALBUMIN SERPL ELPH-MCNC: 4.1 G/DL
ALP BLD-CCNC: 63 U/L
ALT SERPL-CCNC: 14 U/L
ANION GAP SERPL CALC-SCNC: 14 MMOL/L
AST SERPL-CCNC: 14 U/L
BILIRUB SERPL-MCNC: 0.4 MG/DL
BUN SERPL-MCNC: 40 MG/DL
CALCIUM SERPL-MCNC: 11 MG/DL
CHLORIDE SERPL-SCNC: 104 MMOL/L
CO2 SERPL-SCNC: 23 MMOL/L
CREAT SERPL-MCNC: 1.81 MG/DL
GLUCOSE SERPL-MCNC: 112 MG/DL
POTASSIUM SERPL-SCNC: 4.3 MMOL/L
PROT SERPL-MCNC: 6.4 G/DL
SODIUM SERPL-SCNC: 140 MMOL/L
T3RU NFR SERPL: 1 TBI
T4 SERPL-MCNC: 9.2 UG/DL
THYROGLOB AB SERPL-ACNC: <20 IU/ML
THYROGLOB SERPL-MCNC: 7.47 NG/ML
TSH SERPL-ACNC: 1.29 UIU/ML

## 2020-01-17 NOTE — DATA REVIEWED
[FreeTextEntry1] : 7/2019\par PROCEDURE DATE: 07/17/2019 \par  \par INTERPRETATION: CLINICAL INFORMATION: Thyroid nodules. Status post right hemithyroidectomy. \par COMPARISON: Thyroid ultrasound dated 12/14/2017 and 7/26/2017. \par TECHNIQUE: Sonography of the thyroid. \par FINDINGS: \par Right Lobe: Status post right thyroidectomy. There is a solid and cystic cystic structure within the \par thyroidectomy bed measuring 2.9 x 0.8 x 1.4 cm. This measurement includes a cyst that measures \par approximately 1.6 x 0.9 cm. A second serpiginous cystic duct or is noted just cephalad to the solid and cystic \par structure measuring approximately 1.5 x 0.6 x 1.3 cm. These findings are not significantly changed compared to \par prior sonogram \par Left Lobe: 3.9 x 1.4 x 1.7 cm. There is a well-defined cystic and solid nodule midpole measuring 1.5 x 1.1 x 1.3 \par cm. There is increasing cystic component. There is a well-defined solid and cystic nodule of the lower pole \par measuring 1.3 x 0.7 x 1.6 cm. These are overall unchanged in size \par Isthmus: 3 mm. \par Cervical Lymph Nodes: No enlarged or abnormal morphology cervical nodes. \par IMPRESSION:\par Status post right thyroidectomy. \par Stable appearance of a solid and cystic structure within the right thyroidectomy bed. \par Unchanged left thyroid nodules.\par \par 12/2017\par Thyroid ultrasound - stable nodules\par \par DXA 2016\par T score  F neck -2.4; T hip -2.8, 1/3 rad -1.0\par \par 4/2015\par Stable b/l thyroid nodules

## 2020-01-17 NOTE — HISTORY OF PRESENT ILLNESS
[FreeTextEntry1] : cc: osteoporosis\par \par 85 year old woman with osteoporosis, on Prolia x 8 years.  She reports no recent falls or fractures. No decrease in height.  She consumes 1-2 servings dairy daily and takes supplemental vitamin D 2000 units daily. She usually has regular dental f/u, though has missed her last appt.   She reports no thigh pain. \par \par She also has hyperparathyroidism with mild hypercalcemia (previously on lithium) -Reports no kidney stones, tries stay hydrated\par \par Also with thyroid cancer post hemithyroidectomy, with stable nodules in thyroidectomy bed.  She has not noted any recent change in her neck, no dysphagia or dyspnea.  \par \par \par

## 2020-01-17 NOTE — ASSESSMENT
[Denosumab Therapy] : Risks  and benefits of denosumab therapy were discussed with the patient including eczema, cellulitis, osteonecrosis of the jaw and atypical femur fractures [FreeTextEntry1] : 85 year old woman woman with osteoporosis, on Prolia tx x 8 years\par  - Pt with improved bone density at all sites on last DXA\par   - Continue Prolia, administered today without complication [source:stock]\par  -discussed transitioning to bisphosphonate then giving drug holiday in the future (she prefers to stay on prolia for now)\par   - continue vitamin D supplementation\par  - repeat dxa  in 1 year\par \par Hyperparathyroidism/hypercalcemia -    \par  -Continue to monitor with medical tx for osteoporosis for now\par  - check cmp\par   Reviewed importance of maintaining adequate fluid intake\par \par Thyroid cancer, post-operative hypothyroidism, thyroid nodules\par  -Check tfts with Tg; adjust levothyroxine dose as needed\par   -Nodules have been stable.   Repeat thyroid ultrasound to monitor for change 6-12 months\par \par f/u in 6 months

## 2020-04-01 ENCOUNTER — APPOINTMENT (OUTPATIENT)
Dept: PSYCHIATRY | Facility: CLINIC | Age: 85
End: 2020-04-01

## 2020-07-11 ENCOUNTER — RX RENEWAL (OUTPATIENT)
Age: 85
End: 2020-07-11

## 2020-07-20 ENCOUNTER — APPOINTMENT (OUTPATIENT)
Dept: ENDOCRINOLOGY | Facility: CLINIC | Age: 85
End: 2020-07-20
Payer: MEDICARE

## 2020-07-20 VITALS
HEART RATE: 88 BPM | BODY MASS INDEX: 26.58 KG/M2 | OXYGEN SATURATION: 96 % | TEMPERATURE: 98.1 F | DIASTOLIC BLOOD PRESSURE: 58 MMHG | HEIGHT: 63 IN | WEIGHT: 150 LBS | SYSTOLIC BLOOD PRESSURE: 122 MMHG

## 2020-07-20 PROCEDURE — 99214 OFFICE O/P EST MOD 30 MIN: CPT | Mod: 25

## 2020-07-20 PROCEDURE — 96372 THER/PROPH/DIAG INJ SC/IM: CPT

## 2020-07-20 RX ORDER — DENOSUMAB 60 MG/ML
60 INJECTION SUBCUTANEOUS
Qty: 1 | Refills: 0 | Status: COMPLETED | OUTPATIENT
Start: 2020-07-20

## 2020-07-20 RX ADMIN — DENOSUMAB 0 MG/ML: 60 INJECTION SUBCUTANEOUS at 00:00

## 2020-07-20 NOTE — PHYSICAL EXAM
[Alert] : alert [Healthy Appearance] : healthy appearance [No Acute Distress] : no acute distress [Normal Sclera/Conjunctiva] : normal sclera/conjunctiva [No Proptosis] : no proptosis [No Neck Mass] : no neck mass was observed [Well Healed Scar] : well healed scar [No Respiratory Distress] : no respiratory distress [Clear to Auscultation] : lungs were clear to auscultation bilaterally [Normal S1, S2] : normal S1 and S2 [Regular Rhythm] : with a regular rhythm [No Edema] : no peripheral edema [Normal Bowel Sounds] : normal bowel sounds [Not Tender] : non-tender [Soft] : abdomen soft [No Spinal Tenderness] : no spinal tenderness [Kyphosis] : kyphosis present [No Involuntary Movements] : no involuntary movements were seen [Normal Reflexes] : deep tendon reflexes were 2+ and symmetric [Normal Affect] : the affect was normal [Normal Mood] : the mood was normal

## 2020-07-21 NOTE — HISTORY OF PRESENT ILLNESS
[FreeTextEntry1] : cc: osteoporosis\par \par 86 year old woman with osteoporosis, on Prolia x 8.5 years.  No recent falls or fractures. No decrease in height.  No thigh pain. Has regular dental care. She consumes 1-2 servings dairy daily and takes supplemental vitamin D 2000 units daily. \par \par Also with hyperparathyroidism with mild hypercalcemia (previously on lithium) -Reports no kidney stones, tries drink fluids throughout the day\par \par Thyroid cancer post hemithyroidectomy, with stable nodules in thyroidectomy bed.  She reports no recent change in her neck, no dyspnea or dysphagia.  \par \par \par

## 2020-07-21 NOTE — ASSESSMENT
[FreeTextEntry1] : 86 year old woman woman with osteoporosis, on Prolia tx x 8.5 years\par  - Pt with improved bone density at all sites on last DXA\par   - Continue Prolia, administered today without complication [source:stock]\par   - continue vitamin D supplementation\par  - repeat dxa  in  6 months\par \par Hyperparathyroidism/hypercalcemia -    \par  -Continue to monitor with medical tx for osteoporosis \par  - check cmp\par   Reviewed importance of maintaining adequate fluid intake\par \par Thyroid cancer, post-operative hypothyroidism, thyroid nodules\par  -Clinically and biochemically euthyroid, continue levothyroxine \par   -Nodules have been stable.   Repeat thyroid ultrasound to monitor for change\par \par f/u in 6 months

## 2020-07-23 LAB
ALBUMIN SERPL ELPH-MCNC: 4.1 G/DL
ALP BLD-CCNC: 64 U/L
ALT SERPL-CCNC: 11 U/L
ANION GAP SERPL CALC-SCNC: 11 MMOL/L
AST SERPL-CCNC: 16 U/L
BILIRUB SERPL-MCNC: 0.3 MG/DL
BUN SERPL-MCNC: 37 MG/DL
CALCIUM SERPL-MCNC: 11.4 MG/DL
CHLORIDE SERPL-SCNC: 104 MMOL/L
CO2 SERPL-SCNC: 25 MMOL/L
CREAT SERPL-MCNC: 1.79 MG/DL
GLUCOSE SERPL-MCNC: 92 MG/DL
POTASSIUM SERPL-SCNC: 4.5 MMOL/L
PROT SERPL-MCNC: 6.7 G/DL
SODIUM SERPL-SCNC: 140 MMOL/L

## 2020-07-24 ENCOUNTER — RX RENEWAL (OUTPATIENT)
Age: 85
End: 2020-07-24

## 2020-07-27 ENCOUNTER — RX RENEWAL (OUTPATIENT)
Age: 85
End: 2020-07-27

## 2020-09-04 ENCOUNTER — APPOINTMENT (OUTPATIENT)
Dept: INTERNAL MEDICINE | Facility: CLINIC | Age: 85
End: 2020-09-04

## 2020-09-11 ENCOUNTER — APPOINTMENT (OUTPATIENT)
Dept: INTERNAL MEDICINE | Facility: CLINIC | Age: 85
End: 2020-09-11
Payer: MEDICARE

## 2020-09-11 VITALS
HEART RATE: 90 BPM | HEIGHT: 63 IN | OXYGEN SATURATION: 95 % | BODY MASS INDEX: 27.46 KG/M2 | TEMPERATURE: 97.9 F | WEIGHT: 155 LBS

## 2020-09-11 PROCEDURE — 99214 OFFICE O/P EST MOD 30 MIN: CPT | Mod: 25

## 2020-09-11 PROCEDURE — 90662 IIV NO PRSV INCREASED AG IM: CPT

## 2020-09-11 PROCEDURE — G0008: CPT

## 2020-09-17 VITALS — SYSTOLIC BLOOD PRESSURE: 148 MMHG | DIASTOLIC BLOOD PRESSURE: 70 MMHG

## 2020-09-17 NOTE — HISTORY OF PRESENT ILLNESS
[de-identified] : The patient comes in for routine follow-up.  She says her right leg can feel stiff.  She is not walking too much.  She feels her balance isn't good.  No falls.  \par \par She would like to have the flu vaccine.\par \par She hasn't seen a psychiatrist recently and she would like to see a new one.  She says she is stable.

## 2020-09-17 NOTE — ASSESSMENT
[FreeTextEntry1] : The blood pressure is acceptable at 148/70.  Discussed diet.\par \par She has some stiffness and balance difficulty.  A trial of PT was advised.\par \par She was given names of psychiatrists and she was counseled.\par \par Flu vaccine today.\par \par She is s/p Shingrix.\par \par She was counseled regarding COVID-19 precautions.\par \par She will return for blood tests and a CPE.

## 2020-09-25 ENCOUNTER — RX RENEWAL (OUTPATIENT)
Age: 85
End: 2020-09-25

## 2020-10-13 ENCOUNTER — APPOINTMENT (OUTPATIENT)
Dept: INTERNAL MEDICINE | Facility: CLINIC | Age: 85
End: 2020-10-13
Payer: MEDICARE

## 2020-10-13 VITALS
SYSTOLIC BLOOD PRESSURE: 130 MMHG | BODY MASS INDEX: 26.46 KG/M2 | TEMPERATURE: 97.7 F | HEART RATE: 98 BPM | OXYGEN SATURATION: 95 % | DIASTOLIC BLOOD PRESSURE: 60 MMHG | WEIGHT: 155 LBS | HEIGHT: 64 IN

## 2020-10-13 DIAGNOSIS — T84.84XA PAIN DUE TO INTERNAL ORTHOPEDIC PROSTHETIC DEVICES, IMPLANTS AND GRAFTS, INITIAL ENCOUNTER: ICD-10-CM

## 2020-10-13 DIAGNOSIS — Z96.649 PAIN DUE TO INTERNAL ORTHOPEDIC PROSTHETIC DEVICES, IMPLANTS AND GRAFTS, INITIAL ENCOUNTER: ICD-10-CM

## 2020-10-13 PROCEDURE — 99214 OFFICE O/P EST MOD 30 MIN: CPT

## 2020-10-14 NOTE — HISTORY OF PRESENT ILLNESS
[FreeTextEntry1] : FUV  [de-identified] : IRISH KAUFMAN is an 87 yo woman with hypertension, hypercholesterolemia, hypothyroidism, bipolar disorder here for red rash under left breast. Pt is unsure how long it has been present. It is not painful.  It is red and hot to the touch.  The patient feels well overall.\par \par Medical problems are stable on her current medications\par \par Denies fever

## 2020-10-14 NOTE — PHYSICAL EXAM
[No Acute Distress] : no acute distress [Well Nourished] : well nourished [Well Developed] : well developed [Well-Appearing] : well-appearing [No Lymphadenopathy] : no lymphadenopathy [Supple] : supple [No Respiratory Distress] : no respiratory distress  [No Accessory Muscle Use] : no accessory muscle use [Clear to Auscultation] : lungs were clear to auscultation bilaterally [Normal Rate] : normal rate  [Regular Rhythm] : with a regular rhythm [Normal S1, S2] : normal S1 and S2 [No Edema] : there was no peripheral edema [Normal Gait] : normal gait [Alert and Oriented x3] : oriented to person, place, and time [de-identified] : red vesicular rash under left breast and redness of breast.  No exudate

## 2020-10-14 NOTE — REVIEW OF SYSTEMS
[Fever] : no fever [Vision Problems] : no vision problems [Nasal Discharge] : no nasal discharge [Chest Pain] : no chest pain [Shortness Of Breath] : no shortness of breath [Abdominal Pain] : no abdominal pain [Skin Rash] : skin rash

## 2020-10-15 ENCOUNTER — APPOINTMENT (OUTPATIENT)
Dept: INTERNAL MEDICINE | Facility: CLINIC | Age: 85
End: 2020-10-15
Payer: MEDICARE

## 2020-10-15 VITALS
OXYGEN SATURATION: 92 % | HEIGHT: 61 IN | SYSTOLIC BLOOD PRESSURE: 170 MMHG | DIASTOLIC BLOOD PRESSURE: 70 MMHG | BODY MASS INDEX: 29.27 KG/M2 | HEART RATE: 119 BPM | WEIGHT: 155 LBS | TEMPERATURE: 97.4 F

## 2020-10-15 DIAGNOSIS — L03.90 CELLULITIS, UNSPECIFIED: ICD-10-CM

## 2020-10-15 DIAGNOSIS — F31.9 BIPOLAR DISORDER, UNSPECIFIED: ICD-10-CM

## 2020-10-15 PROCEDURE — 99214 OFFICE O/P EST MOD 30 MIN: CPT

## 2020-10-15 NOTE — HISTORY OF PRESENT ILLNESS
[FreeTextEntry1] : FUV  [de-identified] : IRISH KAUFMAN is an 85 yo woman with hypertension, hypercholesterolemia, hypothyroidism, bipolar disorder here for red rash under left breast. The red rash has significantly improved on doxycycline and valtrex. It is less red and hot to the touch.  The patient feels well overall.\par \par Medical problems are stable on her current medications\par \par Denies fever

## 2020-10-15 NOTE — PHYSICAL EXAM
[No Acute Distress] : no acute distress [Well Nourished] : well nourished [Well Developed] : well developed [No Lymphadenopathy] : no lymphadenopathy [Well-Appearing] : well-appearing [No Respiratory Distress] : no respiratory distress  [Supple] : supple [No Accessory Muscle Use] : no accessory muscle use [Clear to Auscultation] : lungs were clear to auscultation bilaterally [Regular Rhythm] : with a regular rhythm [Normal Rate] : normal rate  [No Edema] : there was no peripheral edema [Normal S1, S2] : normal S1 and S2 [Normal Gait] : normal gait [Alert and Oriented x3] : oriented to person, place, and time [de-identified] : red vesicular rash under left breast and redness of breast, improved and decreased.  No exudate

## 2020-10-15 NOTE — REVIEW OF SYSTEMS
[Skin Rash] : skin rash [Fever] : no fever [Vision Problems] : no vision problems [Nasal Discharge] : no nasal discharge [Shortness Of Breath] : no shortness of breath [Chest Pain] : no chest pain [Abdominal Pain] : no abdominal pain

## 2020-11-17 ENCOUNTER — APPOINTMENT (OUTPATIENT)
Dept: INTERNAL MEDICINE | Facility: CLINIC | Age: 85
End: 2020-11-17
Payer: MEDICARE

## 2020-11-17 ENCOUNTER — NON-APPOINTMENT (OUTPATIENT)
Age: 85
End: 2020-11-17

## 2020-11-17 VITALS — SYSTOLIC BLOOD PRESSURE: 125 MMHG | DIASTOLIC BLOOD PRESSURE: 55 MMHG

## 2020-11-17 VITALS
OXYGEN SATURATION: 95 % | HEIGHT: 64 IN | BODY MASS INDEX: 25.61 KG/M2 | WEIGHT: 150 LBS | TEMPERATURE: 97.3 F | HEART RATE: 74 BPM

## 2020-11-17 PROCEDURE — 36415 COLL VENOUS BLD VENIPUNCTURE: CPT

## 2020-11-17 PROCEDURE — G0444 DEPRESSION SCREEN ANNUAL: CPT | Mod: 59

## 2020-11-17 PROCEDURE — G0439: CPT

## 2020-11-17 PROCEDURE — 93000 ELECTROCARDIOGRAM COMPLETE: CPT | Mod: 59

## 2020-11-17 RX ORDER — HYDROCORTISONE 25 MG/G
2.5 CREAM TOPICAL
Qty: 60 | Refills: 0 | Status: DISCONTINUED | COMMUNITY
Start: 2017-09-30 | End: 2020-11-17

## 2020-11-17 RX ORDER — VALACYCLOVIR 1 G/1
1 TABLET, FILM COATED ORAL 3 TIMES DAILY
Qty: 21 | Refills: 0 | Status: DISCONTINUED | COMMUNITY
Start: 2020-10-13 | End: 2020-11-17

## 2020-11-17 RX ORDER — DOXYCYCLINE 100 MG/1
100 CAPSULE ORAL TWICE DAILY
Qty: 20 | Refills: 0 | Status: DISCONTINUED | COMMUNITY
Start: 2020-10-26 | End: 2020-11-17

## 2020-11-17 RX ORDER — DOXYCYCLINE 100 MG/1
100 CAPSULE ORAL TWICE DAILY
Qty: 20 | Refills: 0 | Status: DISCONTINUED | COMMUNITY
Start: 2020-10-13 | End: 2020-11-17

## 2020-11-17 NOTE — HISTORY OF PRESENT ILLNESS
[FreeTextEntry1] : The patient is here for a routine visit.  [de-identified] : She feels well.  Her diet and appetite are good.  She does some walking.\par \par Balance is about the same.  No falls.\par \par The previous rash under her breasts is improved and not bothersome now.

## 2020-11-17 NOTE — HEALTH RISK ASSESSMENT
[No] : No [No falls in past year] : Patient reported no falls in the past year [3] : 1) Little interest or pleasure doing things for nearly every day (3) [0] : 2) Feeling down, depressed, or hopeless: Not at all (0) [Fully functional (bathing, dressing, toileting, transferring, walking, feeding)] : Fully functional (bathing, dressing, toileting, transferring, walking, feeding) [Fully functional (using the telephone, shopping, preparing meals, housekeeping, doing laundry, using] : Fully functional and needs no help or supervision to perform IADLs (using the telephone, shopping, preparing meals, housekeeping, doing laundry, using transportation, managing medications and managing finances) [] : No [[id number]] : 3 [Reports changes in hearing] : Reports no changes in hearing [Reports changes in vision] : Reports no changes in vision [Reports changes in dental health] : Reports no changes in dental health

## 2020-11-17 NOTE — ASSESSMENT
[FreeTextEntry1] : Check lipids on Simvastatin.  Discussed diet and exercise.\par \par Check TFTs on Levothyroxine.\par \par Monitor calcium.\par \par Check a HgBA1c.\par \par The BP is very good at 125/55.\par \par Balance is fair and we agreed she will do a trial of PT after the pandemic has settled down.\par \par She hasn't recently seen a psychiatrist.  She appears to be doing well on current meds.\par \par She sees Dr. Mercado and is on Prolia.\par \par She wants to defer a mammogram.  Breast exam normal.\par \par Defer colonoscopy.\par \par S/p flu vaccine, Prevnar, Pneumovax, Shingrix.

## 2020-11-17 NOTE — PHYSICAL EXAM
[No Acute Distress] : no acute distress [Well Nourished] : well nourished [Well Developed] : well developed [Well-Appearing] : well-appearing [Normal Sclera/Conjunctiva] : normal sclera/conjunctiva [PERRL] : pupils equal round and reactive to light [EOMI] : extraocular movements intact [Normal Outer Ear/Nose] : the outer ears and nose were normal in appearance [Normal Oropharynx] : the oropharynx was normal [No JVD] : no jugular venous distention [No Lymphadenopathy] : no lymphadenopathy [Supple] : supple [Thyroid Normal, No Nodules] : the thyroid was normal and there were no nodules present [No Respiratory Distress] : no respiratory distress  [No Accessory Muscle Use] : no accessory muscle use [Clear to Auscultation] : lungs were clear to auscultation bilaterally [Normal Rate] : normal rate  [Regular Rhythm] : with a regular rhythm [Normal S1, S2] : normal S1 and S2 [No Murmur] : no murmur heard [No Carotid Bruits] : no carotid bruits [No Abdominal Bruit] : a ~M bruit was not heard ~T in the abdomen [No Varicosities] : no varicosities [Pedal Pulses Present] : the pedal pulses are present [No Edema] : there was no peripheral edema [No Palpable Aorta] : no palpable aorta [No Extremity Clubbing/Cyanosis] : no extremity clubbing/cyanosis [Normal Appearance] : normal in appearance [No Nipple Discharge] : no nipple discharge [No Axillary Lymphadenopathy] : no axillary lymphadenopathy [Soft] : abdomen soft [Non Tender] : non-tender [Non-distended] : non-distended [No Masses] : no abdominal mass palpated [No HSM] : no HSM [Normal Bowel Sounds] : normal bowel sounds [Normal Posterior Cervical Nodes] : no posterior cervical lymphadenopathy [Normal Anterior Cervical Nodes] : no anterior cervical lymphadenopathy [No CVA Tenderness] : no CVA  tenderness [No Spinal Tenderness] : no spinal tenderness [No Joint Swelling] : no joint swelling [Grossly Normal Strength/Tone] : grossly normal strength/tone [No Rash] : no rash [Coordination Grossly Intact] : coordination grossly intact [No Focal Deficits] : no focal deficits [Normal Gait] : normal gait [Normal Affect] : the affect was normal [Normal Insight/Judgement] : insight and judgment were intact [de-identified] : faded reddish rash under the breasts B/L.

## 2021-01-25 ENCOUNTER — APPOINTMENT (OUTPATIENT)
Dept: ENDOCRINOLOGY | Facility: CLINIC | Age: 86
End: 2021-01-25

## 2021-01-25 ENCOUNTER — RX RENEWAL (OUTPATIENT)
Age: 86
End: 2021-01-25

## 2021-01-29 ENCOUNTER — RX RENEWAL (OUTPATIENT)
Age: 86
End: 2021-01-29

## 2021-02-02 ENCOUNTER — LABORATORY RESULT (OUTPATIENT)
Age: 86
End: 2021-02-02

## 2021-02-02 ENCOUNTER — APPOINTMENT (OUTPATIENT)
Dept: ENDOCRINOLOGY | Facility: CLINIC | Age: 86
End: 2021-02-02
Payer: MEDICARE

## 2021-02-02 VITALS — DIASTOLIC BLOOD PRESSURE: 60 MMHG | OXYGEN SATURATION: 93 % | HEART RATE: 77 BPM | SYSTOLIC BLOOD PRESSURE: 120 MMHG

## 2021-02-02 VITALS — TEMPERATURE: 98.6 F | WEIGHT: 153 LBS | BODY MASS INDEX: 28.16 KG/M2 | HEIGHT: 62 IN

## 2021-02-02 PROCEDURE — 96372 THER/PROPH/DIAG INJ SC/IM: CPT

## 2021-02-02 PROCEDURE — 77080 DXA BONE DENSITY AXIAL: CPT

## 2021-02-02 PROCEDURE — 99214 OFFICE O/P EST MOD 30 MIN: CPT | Mod: 25

## 2021-02-02 RX ORDER — DENOSUMAB 60 MG/ML
60 INJECTION SUBCUTANEOUS
Qty: 1 | Refills: 0 | Status: COMPLETED | OUTPATIENT
Start: 2021-02-02

## 2021-02-02 RX ADMIN — DENOSUMAB 0 MG/ML: 60 INJECTION SUBCUTANEOUS at 00:00

## 2021-02-03 NOTE — ASSESSMENT
[Denosumab Therapy] : Risks  and benefits of denosumab therapy were discussed with the patient including eczema, cellulitis, osteonecrosis of the jaw and atypical femur fractures [FreeTextEntry1] : 86 year old woman woman with osteoporosis, on Prolia tx x 9 years\par  - DXA performed today, Pt with further improvement in bone density at  hip and spine\par   - Continue Prolia, administered today without complication [source:stock]\par   - continue vitamin D supplementation\par  - repeat dxa  in  2  years\par \par Hyperparathyroidism/hypercalcemia -    \par  -Continue to monitor with medical tx for osteoporosis \par  - check cmp\par  - Reviewed importance of maintaining adequate fluid intake\par \par Thyroid cancer, post-operative hypothyroidism, thyroid nodules\par  -Clinically  euthyroid, cehck tfts and tg, adjust levothyroxine as needed\par   -Nodules have been stable.   Repeat thyroid ultrasound to monitor for change\par \par f/u in 6 months

## 2021-02-03 NOTE — HISTORY OF PRESENT ILLNESS
[FreeTextEntry1] : cc: osteoporosis\par \par 86 year old woman with osteoporosis, on Prolia x 9 years.   No decrease in height. No recent falls or fractures. No thigh pain. Has not recently seen dentist. She consumes 1-2 servings dairy daily and takes supplemental vitamin D 2000 units daily. \par \par Also with hyperparathyroidism with mild hypercalcemia (previously on lithium) -Reports no kidney stones, drinks a lot of fluids throughout the day (mostly water)\par \par Thyroid cancer post hemithyroidectomy, with stable nodules in thyroidectomy bed.  She has not noted any change in her neck, reports no dyspnea or dysphagia.  \par \par \par

## 2021-02-03 NOTE — PHYSICAL EXAM
[Alert] : alert [Healthy Appearance] : healthy appearance [No Acute Distress] : no acute distress [Normal Sclera/Conjunctiva] : normal sclera/conjunctiva [No Proptosis] : no proptosis [No Oral Ulcers] : no oral ulcers [No Neck Mass] : no neck mass was observed [No LAD] : no lymphadenopathy [No Respiratory Distress] : no respiratory distress [Clear to Auscultation] : lungs were clear to auscultation bilaterally [Normal S1, S2] : normal S1 and S2 [Regular Rhythm] : with a regular rhythm [Normal Bowel Sounds] : normal bowel sounds [Not Tender] : non-tender [Soft] : abdomen soft [No Spinal Tenderness] : no spinal tenderness [No Involuntary Movements] : no involuntary movements were seen [Normal Reflexes] : deep tendon reflexes were 2+ and symmetric [No Tremors] : no tremors [Oriented x3] : oriented to person, place, and time [Normal Mood] : the mood was normal

## 2021-02-12 LAB
ALBUMIN SERPL ELPH-MCNC: 4.2 G/DL
ALP BLD-CCNC: 86 U/L
ALT SERPL-CCNC: 18 U/L
ANION GAP SERPL CALC-SCNC: 12 MMOL/L
AST SERPL-CCNC: 18 U/L
BILIRUB SERPL-MCNC: 0.3 MG/DL
BUN SERPL-MCNC: 34 MG/DL
CALCIUM SERPL-MCNC: 11.6 MG/DL
CHLORIDE SERPL-SCNC: 103 MMOL/L
CO2 SERPL-SCNC: 24 MMOL/L
CREAT SERPL-MCNC: 1.82 MG/DL
GLUCOSE SERPL-MCNC: 86 MG/DL
POTASSIUM SERPL-SCNC: 5 MMOL/L
PROT SERPL-MCNC: 6.4 G/DL
SODIUM SERPL-SCNC: 139 MMOL/L
T3RU NFR SERPL: 1 TBI
T4 SERPL-MCNC: 8.9 UG/DL
THYROGLOB AB SERPL-ACNC: <20 IU/ML
THYROGLOB SERPL-MCNC: 4.93 NG/ML
TSH SERPL-ACNC: 1.22 UIU/ML

## 2021-02-18 LAB
ALBUMIN SERPL ELPH-MCNC: 4.3 G/DL
ALP BLD-CCNC: 80 U/L
ALT SERPL-CCNC: 15 U/L
ANION GAP SERPL CALC-SCNC: 12 MMOL/L
AST SERPL-CCNC: 16 U/L
BILIRUB SERPL-MCNC: 0.3 MG/DL
BUN SERPL-MCNC: 36 MG/DL
CALCIUM SERPL-MCNC: 10.8 MG/DL
CALCIUM SERPL-MCNC: 10.8 MG/DL
CHLORIDE SERPL-SCNC: 106 MMOL/L
CO2 SERPL-SCNC: 23 MMOL/L
CREAT SERPL-MCNC: 1.6 MG/DL
GLUCOSE SERPL-MCNC: 83 MG/DL
PARATHYROID HORMONE INTACT: 197 PG/ML
POTASSIUM SERPL-SCNC: 4.6 MMOL/L
PROT SERPL-MCNC: 7 G/DL
SODIUM SERPL-SCNC: 141 MMOL/L

## 2021-02-26 ENCOUNTER — NON-APPOINTMENT (OUTPATIENT)
Age: 86
End: 2021-02-26

## 2021-03-03 ENCOUNTER — NON-APPOINTMENT (OUTPATIENT)
Age: 86
End: 2021-03-03

## 2021-03-03 LAB
25(OH)D3 SERPL-MCNC: 56.3 NG/ML
ALBUMIN SERPL ELPH-MCNC: 4.2 G/DL
ALP BLD-CCNC: 61 U/L
ALT SERPL-CCNC: 15 U/L
ANION GAP SERPL CALC-SCNC: 14 MMOL/L
APPEARANCE: CLEAR
AST SERPL-CCNC: 17 U/L
BACTERIA: NEGATIVE
BASOPHILS # BLD AUTO: 0.04 K/UL
BASOPHILS NFR BLD AUTO: 0.9 %
BILIRUB SERPL-MCNC: 0.3 MG/DL
BILIRUBIN URINE: NEGATIVE
BLOOD URINE: NEGATIVE
BUN SERPL-MCNC: 36 MG/DL
CALCIUM SERPL-MCNC: 10.5 MG/DL
CHLORIDE SERPL-SCNC: 106 MMOL/L
CHOLEST SERPL-MCNC: 174 MG/DL
CO2 SERPL-SCNC: 18 MMOL/L
COLOR: NORMAL
CREAT SERPL-MCNC: 1.67 MG/DL
EOSINOPHIL # BLD AUTO: 0.15 K/UL
EOSINOPHIL NFR BLD AUTO: 3.4 %
ESTIMATED AVERAGE GLUCOSE: 114 MG/DL
GLUCOSE QUALITATIVE U: NEGATIVE
GLUCOSE SERPL-MCNC: 92 MG/DL
HBA1C MFR BLD HPLC: 5.6 %
HCT VFR BLD CALC: 43.1 %
HDLC SERPL-MCNC: 56 MG/DL
HGB BLD-MCNC: 12.8 G/DL
HYALINE CASTS: 0 /LPF
IMM GRANULOCYTES NFR BLD AUTO: 0.2 %
KETONES URINE: NEGATIVE
LDLC SERPL CALC-MCNC: 89 MG/DL
LEUKOCYTE ESTERASE URINE: NEGATIVE
LYMPHOCYTES # BLD AUTO: 1.55 K/UL
LYMPHOCYTES NFR BLD AUTO: 34.7 %
MAN DIFF?: NORMAL
MCHC RBC-ENTMCNC: 28.5 PG
MCHC RBC-ENTMCNC: 29.7 GM/DL
MCV RBC AUTO: 96 FL
MICROSCOPIC-UA: NORMAL
MONOCYTES # BLD AUTO: 0.44 K/UL
MONOCYTES NFR BLD AUTO: 9.8 %
NEUTROPHILS # BLD AUTO: 2.28 K/UL
NEUTROPHILS NFR BLD AUTO: 51 %
NITRITE URINE: NEGATIVE
NONHDLC SERPL-MCNC: 118 MG/DL
PH URINE: 6
PLATELET # BLD AUTO: 237 K/UL
POTASSIUM SERPL-SCNC: 4.8 MMOL/L
PROT SERPL-MCNC: 6.6 G/DL
PROTEIN URINE: NEGATIVE
RBC # BLD: 4.49 M/UL
RBC # FLD: 14.4 %
RED BLOOD CELLS URINE: 0 /HPF
SODIUM SERPL-SCNC: 138 MMOL/L
SPECIFIC GRAVITY URINE: 1.01
SQUAMOUS EPITHELIAL CELLS: 0 /HPF
T4 FREE SERPL-MCNC: 1.5 NG/DL
TRIGL SERPL-MCNC: 147 MG/DL
TSH SERPL-ACNC: 0.48 UIU/ML
UROBILINOGEN URINE: NORMAL
WBC # FLD AUTO: 4.47 K/UL
WHITE BLOOD CELLS URINE: 1 /HPF

## 2021-03-12 DIAGNOSIS — Z23 ENCOUNTER FOR IMMUNIZATION: ICD-10-CM

## 2021-05-01 ENCOUNTER — TRANSCRIPTION ENCOUNTER (OUTPATIENT)
Age: 86
End: 2021-05-01

## 2021-05-07 ENCOUNTER — RX RENEWAL (OUTPATIENT)
Age: 86
End: 2021-05-07

## 2021-07-13 ENCOUNTER — RX RENEWAL (OUTPATIENT)
Age: 86
End: 2021-07-13

## 2021-07-18 ENCOUNTER — RX RENEWAL (OUTPATIENT)
Age: 86
End: 2021-07-18

## 2021-07-23 ENCOUNTER — RX RENEWAL (OUTPATIENT)
Age: 86
End: 2021-07-23

## 2021-07-27 ENCOUNTER — RX RENEWAL (OUTPATIENT)
Age: 86
End: 2021-07-27

## 2021-08-02 ENCOUNTER — RX RENEWAL (OUTPATIENT)
Age: 86
End: 2021-08-02

## 2021-08-03 ENCOUNTER — APPOINTMENT (OUTPATIENT)
Dept: ENDOCRINOLOGY | Facility: CLINIC | Age: 86
End: 2021-08-03

## 2021-08-03 ENCOUNTER — RX RENEWAL (OUTPATIENT)
Age: 86
End: 2021-08-03

## 2021-08-12 ENCOUNTER — RX RENEWAL (OUTPATIENT)
Age: 86
End: 2021-08-12

## 2021-08-26 ENCOUNTER — APPOINTMENT (OUTPATIENT)
Dept: ENDOCRINOLOGY | Facility: CLINIC | Age: 86
End: 2021-08-26
Payer: MEDICARE

## 2021-08-26 PROCEDURE — 96372 THER/PROPH/DIAG INJ SC/IM: CPT

## 2021-08-26 RX ADMIN — DENOSUMAB 60 MG/ML: 60 INJECTION SUBCUTANEOUS at 00:00

## 2021-08-30 LAB
25(OH)D3 SERPL-MCNC: 61.7 NG/ML
ALBUMIN SERPL ELPH-MCNC: 4.4 G/DL
ALP BLD-CCNC: 86 U/L
ALT SERPL-CCNC: 19 U/L
ANION GAP SERPL CALC-SCNC: 13 MMOL/L
AST SERPL-CCNC: 16 U/L
BILIRUB SERPL-MCNC: 0.4 MG/DL
BUN SERPL-MCNC: 30 MG/DL
CALCIUM SERPL-MCNC: 11.7 MG/DL
CHLORIDE SERPL-SCNC: 102 MMOL/L
CO2 SERPL-SCNC: 24 MMOL/L
CREAT SERPL-MCNC: 1.68 MG/DL
GLUCOSE SERPL-MCNC: 90 MG/DL
POTASSIUM SERPL-SCNC: 4.8 MMOL/L
PROT SERPL-MCNC: 6.8 G/DL
SODIUM SERPL-SCNC: 139 MMOL/L

## 2021-08-30 RX ORDER — DENOSUMAB 60 MG/ML
60 INJECTION SUBCUTANEOUS
Qty: 1 | Refills: 0 | Status: COMPLETED | OUTPATIENT
Start: 2021-08-26

## 2021-09-01 LAB
ALBUMIN SERPL ELPH-MCNC: 4.5 G/DL
ALP BLD-CCNC: 88 U/L
ALT SERPL-CCNC: 18 U/L
ANION GAP SERPL CALC-SCNC: 14 MMOL/L
AST SERPL-CCNC: 18 U/L
BILIRUB SERPL-MCNC: 0.3 MG/DL
BUN SERPL-MCNC: 29 MG/DL
CALCIUM SERPL-MCNC: 10.1 MG/DL
CHLORIDE SERPL-SCNC: 105 MMOL/L
CO2 SERPL-SCNC: 19 MMOL/L
CREAT SERPL-MCNC: 1.46 MG/DL
GLUCOSE SERPL-MCNC: 93 MG/DL
POTASSIUM SERPL-SCNC: 4.5 MMOL/L
PROT SERPL-MCNC: 7 G/DL
SODIUM SERPL-SCNC: 138 MMOL/L

## 2021-12-03 ENCOUNTER — NON-APPOINTMENT (OUTPATIENT)
Age: 86
End: 2021-12-03

## 2021-12-03 ENCOUNTER — APPOINTMENT (OUTPATIENT)
Dept: INTERNAL MEDICINE | Facility: CLINIC | Age: 86
End: 2021-12-03
Payer: MEDICARE

## 2021-12-03 VITALS
HEART RATE: 99 BPM | HEIGHT: 63 IN | TEMPERATURE: 97.4 F | OXYGEN SATURATION: 95 % | WEIGHT: 146 LBS | BODY MASS INDEX: 25.87 KG/M2

## 2021-12-03 PROCEDURE — 36415 COLL VENOUS BLD VENIPUNCTURE: CPT

## 2021-12-03 PROCEDURE — 93000 ELECTROCARDIOGRAM COMPLETE: CPT | Mod: 59

## 2021-12-03 PROCEDURE — G0444 DEPRESSION SCREEN ANNUAL: CPT | Mod: 59

## 2021-12-03 PROCEDURE — G0439: CPT

## 2021-12-05 VITALS — DIASTOLIC BLOOD PRESSURE: 75 MMHG | SYSTOLIC BLOOD PRESSURE: 142 MMHG

## 2021-12-05 NOTE — HEALTH RISK ASSESSMENT
[No] : No [No falls in past year] : Patient reported no falls in the past year [0] : 2) Feeling down, depressed, or hopeless: Not at all (0) [Fully functional (bathing, dressing, toileting, transferring, walking, feeding)] : Fully functional (bathing, dressing, toileting, transferring, walking, feeding) [Fully functional (using the telephone, shopping, preparing meals, housekeeping, doing laundry, using] : Fully functional and needs no help or supervision to perform IADLs (using the telephone, shopping, preparing meals, housekeeping, doing laundry, using transportation, managing medications and managing finances) [] : No [[id number]] : 0 [Reports changes in hearing] : Reports no changes in hearing [Reports changes in vision] : Reports no changes in vision

## 2021-12-05 NOTE — ASSESSMENT
[FreeTextEntry1] : She appears well and looks to be stable from a psychaitric standpoint.  She isn't seeing a psychiatrist which would be preferable if she would agree.\par \par The  BP is ok at 142/75.\par \par Check lipids on Simvastatin.  Discussed diet and exercise.\par \par Check TFTs on Levothyroxine.\par \par Check a calcium.\par \par She declines a mammogram (exam WNL.)  \par \par She sees an endocrinologist and is on Prolia.\par \par She has a fungal rash under the breasts and a Nystatin cream was prescribed.\par \par S/p flu vaccine, Prevnar, Pneumovax, Shingrix.\par \par She had the COVID-19 booster.  \par \par Defer colonoscopy due to her age.

## 2021-12-05 NOTE — PHYSICAL EXAM
[No Acute Distress] : no acute distress [Well Nourished] : well nourished [Well Developed] : well developed [Well-Appearing] : well-appearing [Normal Sclera/Conjunctiva] : normal sclera/conjunctiva [PERRL] : pupils equal round and reactive to light [EOMI] : extraocular movements intact [Normal Outer Ear/Nose] : the outer ears and nose were normal in appearance [Normal Oropharynx] : the oropharynx was normal [No JVD] : no jugular venous distention [No Lymphadenopathy] : no lymphadenopathy [Supple] : supple [Thyroid Normal, No Nodules] : the thyroid was normal and there were no nodules present [No Respiratory Distress] : no respiratory distress  [No Accessory Muscle Use] : no accessory muscle use [Clear to Auscultation] : lungs were clear to auscultation bilaterally [Normal Rate] : normal rate  [Regular Rhythm] : with a regular rhythm [Normal S1, S2] : normal S1 and S2 [No Murmur] : no murmur heard [No Carotid Bruits] : no carotid bruits [No Abdominal Bruit] : a ~M bruit was not heard ~T in the abdomen [No Varicosities] : no varicosities [Pedal Pulses Present] : the pedal pulses are present [No Edema] : there was no peripheral edema [No Palpable Aorta] : no palpable aorta [No Extremity Clubbing/Cyanosis] : no extremity clubbing/cyanosis [Normal Appearance] : normal in appearance [No Nipple Discharge] : no nipple discharge [No Axillary Lymphadenopathy] : no axillary lymphadenopathy [Soft] : abdomen soft [Non Tender] : non-tender [Non-distended] : non-distended [No Masses] : no abdominal mass palpated [No HSM] : no HSM [Normal Bowel Sounds] : normal bowel sounds [Normal Posterior Cervical Nodes] : no posterior cervical lymphadenopathy [Normal Anterior Cervical Nodes] : no anterior cervical lymphadenopathy [No CVA Tenderness] : no CVA  tenderness [No Spinal Tenderness] : no spinal tenderness [No Joint Swelling] : no joint swelling [Grossly Normal Strength/Tone] : grossly normal strength/tone [No Rash] : no rash [Coordination Grossly Intact] : coordination grossly intact [No Focal Deficits] : no focal deficits [Normal Gait] : normal gait [Deep Tendon Reflexes (DTR)] : deep tendon reflexes were 2+ and symmetric [Normal Affect] : the affect was normal [Normal Insight/Judgement] : insight and judgment were intact [de-identified] : reddish rash under breasts B/L

## 2021-12-05 NOTE — HISTORY OF PRESENT ILLNESS
[FreeTextEntry1] : The patient is here for a routine visit.  [de-identified] : She feels well.  She isn't exercising.  Her appetite and diet are good.\par \par She hasn't recently seen a psychiatrist.\par \par She has a reddish rash under her breasts which she has had before.

## 2022-01-17 ENCOUNTER — RX RENEWAL (OUTPATIENT)
Age: 87
End: 2022-01-17

## 2022-01-26 ENCOUNTER — RX RENEWAL (OUTPATIENT)
Age: 87
End: 2022-01-26

## 2022-01-27 ENCOUNTER — RX RENEWAL (OUTPATIENT)
Age: 87
End: 2022-01-27

## 2022-03-03 ENCOUNTER — APPOINTMENT (OUTPATIENT)
Dept: ENDOCRINOLOGY | Facility: CLINIC | Age: 87
End: 2022-03-03

## 2022-03-14 ENCOUNTER — MED ADMIN CHARGE (OUTPATIENT)
Age: 87
End: 2022-03-14

## 2022-03-14 ENCOUNTER — APPOINTMENT (OUTPATIENT)
Dept: ENDOCRINOLOGY | Facility: CLINIC | Age: 87
End: 2022-03-14
Payer: MEDICARE

## 2022-03-14 PROCEDURE — 96372 THER/PROPH/DIAG INJ SC/IM: CPT

## 2022-03-14 RX ORDER — DENOSUMAB 60 MG/ML
60 INJECTION SUBCUTANEOUS
Qty: 1 | Refills: 0 | Status: COMPLETED | OUTPATIENT
Start: 2022-03-14

## 2022-03-14 RX ADMIN — DENOSUMAB 0 MG/ML: 60 INJECTION SUBCUTANEOUS at 00:00

## 2022-04-02 ENCOUNTER — NON-APPOINTMENT (OUTPATIENT)
Age: 87
End: 2022-04-02

## 2022-04-02 LAB
25(OH)D3 SERPL-MCNC: 40.5 NG/ML
ALBUMIN SERPL ELPH-MCNC: 4.4 G/DL
ALP BLD-CCNC: 68 U/L
ALT SERPL-CCNC: 27 U/L
ANION GAP SERPL CALC-SCNC: 14 MMOL/L
AST SERPL-CCNC: 28 U/L
BASOPHILS # BLD AUTO: 0.04 K/UL
BASOPHILS NFR BLD AUTO: 0.6 %
BILIRUB SERPL-MCNC: 0.2 MG/DL
BUN SERPL-MCNC: 30 MG/DL
CALCIUM SERPL-MCNC: 10.6 MG/DL
CHLORIDE SERPL-SCNC: 103 MMOL/L
CHOLEST SERPL-MCNC: 197 MG/DL
CO2 SERPL-SCNC: 22 MMOL/L
CREAT SERPL-MCNC: 1.6 MG/DL
EOSINOPHIL # BLD AUTO: 0.21 K/UL
EOSINOPHIL NFR BLD AUTO: 3.3 %
ESTIMATED AVERAGE GLUCOSE: 114 MG/DL
GLUCOSE SERPL-MCNC: 106 MG/DL
HBA1C MFR BLD HPLC: 5.6 %
HCT VFR BLD CALC: 40.5 %
HDLC SERPL-MCNC: 48 MG/DL
HGB BLD-MCNC: 12.2 G/DL
IMM GRANULOCYTES NFR BLD AUTO: 0.2 %
LDLC SERPL CALC-MCNC: 96 MG/DL
LYMPHOCYTES # BLD AUTO: 1.73 K/UL
LYMPHOCYTES NFR BLD AUTO: 27.3 %
MAN DIFF?: NORMAL
MCHC RBC-ENTMCNC: 28.6 PG
MCHC RBC-ENTMCNC: 30.1 GM/DL
MCV RBC AUTO: 95.1 FL
MONOCYTES # BLD AUTO: 0.6 K/UL
MONOCYTES NFR BLD AUTO: 9.5 %
NEUTROPHILS # BLD AUTO: 3.74 K/UL
NEUTROPHILS NFR BLD AUTO: 59.1 %
NONHDLC SERPL-MCNC: 149 MG/DL
PLATELET # BLD AUTO: 266 K/UL
POTASSIUM SERPL-SCNC: 4.5 MMOL/L
PROT SERPL-MCNC: 6.9 G/DL
RBC # BLD: 4.26 M/UL
RBC # FLD: 13.3 %
SODIUM SERPL-SCNC: 139 MMOL/L
T4 FREE SERPL-MCNC: 1.4 NG/DL
TRIGL SERPL-MCNC: 266 MG/DL
TSH SERPL-ACNC: 0.65 UIU/ML
WBC # FLD AUTO: 6.33 K/UL

## 2022-04-09 ENCOUNTER — RX RENEWAL (OUTPATIENT)
Age: 87
End: 2022-04-09

## 2022-07-02 ENCOUNTER — RX RENEWAL (OUTPATIENT)
Age: 87
End: 2022-07-02

## 2022-07-08 ENCOUNTER — RX RENEWAL (OUTPATIENT)
Age: 87
End: 2022-07-08

## 2022-07-15 ENCOUNTER — RX RENEWAL (OUTPATIENT)
Age: 87
End: 2022-07-15

## 2022-09-20 ENCOUNTER — APPOINTMENT (OUTPATIENT)
Dept: ENDOCRINOLOGY | Facility: CLINIC | Age: 87
End: 2022-09-20

## 2022-09-20 VITALS — SYSTOLIC BLOOD PRESSURE: 130 MMHG | HEART RATE: 78 BPM | OXYGEN SATURATION: 97 % | DIASTOLIC BLOOD PRESSURE: 80 MMHG

## 2022-09-20 VITALS — BODY MASS INDEX: 27.94 KG/M2 | TEMPERATURE: 97.5 F | HEIGHT: 61 IN | WEIGHT: 148 LBS

## 2022-09-20 DIAGNOSIS — M81.0 AGE-RELATED OSTEOPOROSIS W/OUT CURRENT PATHOLOGICAL FRACTURE: ICD-10-CM

## 2022-09-20 DIAGNOSIS — C73 MALIGNANT NEOPLASM OF THYROID GLAND: ICD-10-CM

## 2022-09-20 PROCEDURE — G0008: CPT

## 2022-09-20 PROCEDURE — ZZZZZ: CPT

## 2022-09-20 PROCEDURE — 77080 DXA BONE DENSITY AXIAL: CPT

## 2022-09-20 PROCEDURE — 99214 OFFICE O/P EST MOD 30 MIN: CPT | Mod: 25

## 2022-09-20 PROCEDURE — 96372 THER/PROPH/DIAG INJ SC/IM: CPT

## 2022-09-20 PROCEDURE — 90662 IIV NO PRSV INCREASED AG IM: CPT

## 2022-09-20 RX ORDER — DENOSUMAB 60 MG/ML
60 INJECTION SUBCUTANEOUS
Qty: 1 | Refills: 0 | Status: COMPLETED | OUTPATIENT
Start: 2022-09-20

## 2022-09-20 RX ADMIN — DENOSUMAB 0 MG/ML: 60 INJECTION SUBCUTANEOUS at 00:00

## 2022-09-20 NOTE — PHYSICAL EXAM
[Alert] : alert [Healthy Appearance] : healthy appearance [No Acute Distress] : no acute distress [Normal Sclera/Conjunctiva] : normal sclera/conjunctiva [No Proptosis] : no proptosis [No Neck Mass] : no neck mass was observed [No LAD] : no lymphadenopathy [Well Healed Scar] : well healed scar [No Respiratory Distress] : no respiratory distress [Clear to Auscultation] : lungs were clear to auscultation bilaterally [Normal S1, S2] : normal S1 and S2 [Regular Rhythm] : with a regular rhythm [No Edema] : no peripheral edema [Normal Bowel Sounds] : normal bowel sounds [Not Tender] : non-tender [Soft] : abdomen soft [No Spinal Tenderness] : no spinal tenderness [Kyphosis] : kyphosis present [No Involuntary Movements] : no involuntary movements were seen [Normal Strength/Tone] : muscle strength and tone were normal [Normal Reflexes] : deep tendon reflexes were 2+ and symmetric [No Tremors] : no tremors [Normal Mood] : the mood was normal

## 2022-09-20 NOTE — ASSESSMENT
[FreeTextEntry1] : 88 year old woman woman with osteoporosis, on Prolia tx x 9 years\par  - DXA performed today, Pt with stable to improved bone density (depending on site)\par   - Continue Prolia, administered today without complication [source:stock]\par   - continue vitamin D supplementation\par  - repeat dxa  in  2  years\par \par Hyperparathyroidism/hypercalcemia -    \par  -Continue to monitor with medical tx for osteoporosis \par  - Reviewed importance of maintaining adequate fluid intake\par  - check cmp\par \par Thyroid cancer, post-operative hypothyroidism, thyroid nodules\par  -Clinically  euthyroid, check tfts and tg, adjust levothyroxine as needed\par   -Nodules have been stable.   Repeat thyroid ultrasound to monitor for change\par \par f/u in 6 months

## 2022-09-20 NOTE — HISTORY OF PRESENT ILLNESS
[FreeTextEntry1] : cc: osteoporosis\par \par 88 year old woman with osteoporosis, on Prolia since 6/2011 .   No recent falls or fractures. No thigh pain. Has not recently seen dentist. She consumes 1-2 servings dairy daily and takes supplemental vitamin D 2000 units daily. \par \par Also with hyperparathyroidism with mild hypercalcemia (previously on lithium) -Drinks a lot of fluids throughout the day (mostly water), reports no kidney stones\par \par Thyroid cancer post hemithyroidectomy, with stable nodules in thyroidectomy bed.  She reports no change in her neck, no dyspnea or dysphagia.  \par \par Has had covid vaccine\par

## 2022-09-21 LAB
ALBUMIN SERPL ELPH-MCNC: 4.5 G/DL
ALP BLD-CCNC: 79 U/L
ALT SERPL-CCNC: 26 U/L
ANION GAP SERPL CALC-SCNC: 12 MMOL/L
AST SERPL-CCNC: 23 U/L
BILIRUB SERPL-MCNC: 0.4 MG/DL
BUN SERPL-MCNC: 35 MG/DL
CALCIUM SERPL-MCNC: 11.8 MG/DL
CHLORIDE SERPL-SCNC: 106 MMOL/L
CO2 SERPL-SCNC: 20 MMOL/L
CREAT SERPL-MCNC: 1.72 MG/DL
EGFR: 28 ML/MIN/1.73M2
GLUCOSE SERPL-MCNC: 102 MG/DL
POTASSIUM SERPL-SCNC: 4.6 MMOL/L
PROT SERPL-MCNC: 7 G/DL
SODIUM SERPL-SCNC: 138 MMOL/L
T4 FREE SERPL-MCNC: 1.5 NG/DL
THYROGLOB AB SERPL-ACNC: <20 IU/ML
THYROGLOB SERPL-MCNC: 16.3 NG/ML
TSH SERPL-ACNC: 2.17 UIU/ML

## 2022-09-22 ENCOUNTER — NON-APPOINTMENT (OUTPATIENT)
Age: 87
End: 2022-09-22

## 2022-10-13 ENCOUNTER — APPOINTMENT (OUTPATIENT)
Dept: ULTRASOUND IMAGING | Facility: CLINIC | Age: 87
End: 2022-10-13

## 2022-10-13 ENCOUNTER — OUTPATIENT (OUTPATIENT)
Dept: OUTPATIENT SERVICES | Facility: HOSPITAL | Age: 87
LOS: 1 days | End: 2022-10-13
Payer: MEDICARE

## 2022-10-13 DIAGNOSIS — C73 MALIGNANT NEOPLASM OF THYROID GLAND: ICD-10-CM

## 2022-10-13 PROCEDURE — 76536 US EXAM OF HEAD AND NECK: CPT

## 2022-10-13 PROCEDURE — 76536 US EXAM OF HEAD AND NECK: CPT | Mod: 26

## 2022-10-21 ENCOUNTER — NON-APPOINTMENT (OUTPATIENT)
Age: 87
End: 2022-10-21

## 2022-12-10 ENCOUNTER — TRANSCRIPTION ENCOUNTER (OUTPATIENT)
Age: 87
End: 2022-12-10

## 2022-12-12 ENCOUNTER — APPOINTMENT (OUTPATIENT)
Dept: INTERNAL MEDICINE | Facility: CLINIC | Age: 87
End: 2022-12-12

## 2022-12-13 ENCOUNTER — APPOINTMENT (OUTPATIENT)
Dept: INTERNAL MEDICINE | Facility: CLINIC | Age: 87
End: 2022-12-13

## 2022-12-13 VITALS
BODY MASS INDEX: 27.19 KG/M2 | HEART RATE: 84 BPM | TEMPERATURE: 97.1 F | HEIGHT: 61 IN | OXYGEN SATURATION: 96 % | WEIGHT: 144 LBS

## 2022-12-13 PROCEDURE — 99214 OFFICE O/P EST MOD 30 MIN: CPT | Mod: 25

## 2022-12-14 VITALS — DIASTOLIC BLOOD PRESSURE: 75 MMHG | SYSTOLIC BLOOD PRESSURE: 135 MMHG
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
## 2022-12-14 NOTE — PHYSICAL EXAM
[Normal] : soft, non-tender, non-distended, no masses palpated, no HSM and normal bowel sounds [de-identified] : Exam nonfocal. She is able to walk but slowly.  She is alert, oriented and appropriate.  Memory grossly intact.  There might be a mild cognitive impairment.

## 2022-12-14 NOTE — ASSESSMENT
[FreeTextEntry1] : It has been reported to the [employer]  that there is a concern about her driving.  She is going to have a DMV evaluation.  The patient is 88 years old and is slower than when last seen.  There might be an early nonfocal cognitive impairment.  I suggested to the patient that she should consider voluntarily giving up driving.  She is reluctant to do that but will think about it.  She also says that she is considering moving into assisted living and I encouraged this because it would eliminated the need for her to drive to do errands.  \par \par I advised that she should probably stop driving at this point.  I told her that I cannot advise her that she can drive safely at this point given the concern expressed elsewhere.  If she does not agree with that decision, that I told her should could see a neurologist for more formal neurological evaluation and she was given a referral.  She will consider that. \par \par She gave me a form which I will hold onto for now but not complete at this time.  \par \par S/p flu vaccine and COVID-19 bivalent vaccine.

## 2022-12-21 ENCOUNTER — RX RENEWAL (OUTPATIENT)
Age: 87
End: 2022-12-21

## 2023-01-03 ENCOUNTER — NON-APPOINTMENT (OUTPATIENT)
Age: 88
End: 2023-01-03

## 2023-01-03 ENCOUNTER — APPOINTMENT (OUTPATIENT)
Dept: INTERNAL MEDICINE | Facility: CLINIC | Age: 88
End: 2023-01-03

## 2023-01-03 DIAGNOSIS — Z92.29 PERSONAL HISTORY OF OTHER DRUG THERAPY: ICD-10-CM

## 2023-01-03 DIAGNOSIS — Z01.818 ENCOUNTER FOR OTHER PREPROCEDURAL EXAMINATION: ICD-10-CM

## 2023-01-03 DIAGNOSIS — Z23 ENCOUNTER FOR IMMUNIZATION: ICD-10-CM

## 2023-01-08 ENCOUNTER — RX RENEWAL (OUTPATIENT)
Age: 88
End: 2023-01-08

## 2023-01-10 RX ORDER — NYSTATIN 100000 [USP'U]/G
100000 CREAM TOPICAL
Qty: 60 | Refills: 3 | Status: ACTIVE | COMMUNITY
Start: 2021-12-03

## 2023-01-17 ENCOUNTER — APPOINTMENT (OUTPATIENT)
Dept: GERIATRICS | Facility: CLINIC | Age: 88
End: 2023-01-17

## 2023-01-17 ENCOUNTER — RX RENEWAL (OUTPATIENT)
Age: 88
End: 2023-01-17

## 2023-02-03 ENCOUNTER — NON-APPOINTMENT (OUTPATIENT)
Age: 88
End: 2023-02-03

## 2023-03-21 ENCOUNTER — APPOINTMENT (OUTPATIENT)
Dept: ENDOCRINOLOGY | Facility: CLINIC | Age: 88
End: 2023-03-21

## 2023-05-09 ENCOUNTER — NON-APPOINTMENT (OUTPATIENT)
Age: 88
End: 2023-05-09

## 2023-05-15 ENCOUNTER — NON-APPOINTMENT (OUTPATIENT)
Age: 88
End: 2023-05-15

## 2023-06-15 ENCOUNTER — RX RENEWAL (OUTPATIENT)
Age: 88
End: 2023-06-15

## 2023-06-23 ENCOUNTER — NON-APPOINTMENT (OUTPATIENT)
Age: 88
End: 2023-06-23

## 2023-06-26 ENCOUNTER — NON-APPOINTMENT (OUTPATIENT)
Age: 88
End: 2023-06-26

## 2023-06-27 ENCOUNTER — NON-APPOINTMENT (OUTPATIENT)
Age: 88
End: 2023-06-27

## 2023-06-28 ENCOUNTER — NON-APPOINTMENT (OUTPATIENT)
Age: 88
End: 2023-06-28

## 2023-06-29 ENCOUNTER — RX RENEWAL (OUTPATIENT)
Age: 88
End: 2023-06-29

## 2023-07-05 ENCOUNTER — APPOINTMENT (OUTPATIENT)
Dept: INTERNAL MEDICINE | Facility: CLINIC | Age: 88
End: 2023-07-05
Payer: MEDICARE

## 2023-07-05 ENCOUNTER — NON-APPOINTMENT (OUTPATIENT)
Age: 88
End: 2023-07-05

## 2023-07-05 VITALS
TEMPERATURE: 97.8 F | HEIGHT: 61 IN | HEART RATE: 77 BPM | WEIGHT: 134 LBS | OXYGEN SATURATION: 96 % | BODY MASS INDEX: 25.3 KG/M2

## 2023-07-05 DIAGNOSIS — E89.0 POSTPROCEDURAL HYPOTHYROIDISM: ICD-10-CM

## 2023-07-05 PROCEDURE — 36415 COLL VENOUS BLD VENIPUNCTURE: CPT

## 2023-07-05 PROCEDURE — 93000 ELECTROCARDIOGRAM COMPLETE: CPT

## 2023-07-05 PROCEDURE — 99496 TRANSJ CARE MGMT HIGH F2F 7D: CPT | Mod: 25

## 2023-07-06 VITALS — DIASTOLIC BLOOD PRESSURE: 68 MMHG | SYSTOLIC BLOOD PRESSURE: 135 MMHG

## 2023-07-06 NOTE — HISTORY OF PRESENT ILLNESS
[FreeTextEntry2] : The patient comes in with an aide and her friend who is serving as supervising her care.\par \par The patient was hospitalized at [facility] after being found on the floor of her home by her .  She was conscious but confused and she doesn't know what happened.  She was evaluated for a CVA and a cerebellar CVA was seen but Mary doesn't know if this was felt to be the acute issue.  She has no specific focal neurologic symptoms.\par \par She had somewhat elevated troponins on admission and there is a report of Afib.  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  She did well and is now home with an aide 12 hours per day seven days per week.  She is alone at night and reports no difficulty. She had one fall the day she came home with no injury.\par \par Her appetite and diet are good and she says she is sleeping well.\par \par She is no longer driving.

## 2023-07-06 NOTE — PHYSICAL EXAM
[Normal] : soft, non-tender, non-distended, no masses palpated, no HSM and normal bowel sounds [de-identified] : Mild cognitive impairment but alert and oriented and appropriate, nonfocal.

## 2023-07-06 NOTE — ASSESSMENT
[FreeTextEntry1] : The patient is now home from the hospital and rehab stay.  She appears well with a mild cognitive impairment.  She has an aide at home for 12 hours per day and her friend Mary supervises her care.\par \par Medications reviewed.  The BP is fine at 135/68.\par \par It's not clear if she had an acute CVA to explain the presentation.  Could consider neurology follow-up.\par \par She is now on ASA 81 mg and Clopidogrel.  There was a report of Afib in the hospital but she wasn't discharged on full anticoagulation.  She is in NSR now.  She was advised to see a cardiologist to evaluate for paroxysmal Afib and the need for anticoagulation.\par \par The calcium improved in the hospital.  She has a history of hyperparathyroidism.  Check a calcium and PTH today.  Consider hypercalcemia as a contributor to her presentation.\par \par She had a pancreatic abnormality of the noncontrast CT in the hospital.  No GI symptoms now and her appetite is good.  Consider follow-up imaging- MRCP.  Check blood tests.  \par \par Check TFts on Levothyroxine.  \par \par She had a fall at home when using a cane.  She has balance difficulty and she was advised to use a walker at all times and she agrees.  She is starting PT.

## 2023-08-01 ENCOUNTER — RX RENEWAL (OUTPATIENT)
Age: 88
End: 2023-08-01

## 2023-08-02 ENCOUNTER — APPOINTMENT (OUTPATIENT)
Dept: CARDIOLOGY | Facility: CLINIC | Age: 88
End: 2023-08-02
Payer: MEDICARE

## 2023-08-02 VITALS
DIASTOLIC BLOOD PRESSURE: 76 MMHG | BODY MASS INDEX: 25.49 KG/M2 | OXYGEN SATURATION: 97 % | SYSTOLIC BLOOD PRESSURE: 157 MMHG | HEART RATE: 70 BPM | WEIGHT: 135 LBS | HEIGHT: 61 IN

## 2023-08-02 VITALS — DIASTOLIC BLOOD PRESSURE: 70 MMHG | SYSTOLIC BLOOD PRESSURE: 155 MMHG | HEART RATE: 68 BPM

## 2023-08-02 DIAGNOSIS — R26.89 OTHER ABNORMALITIES OF GAIT AND MOBILITY: ICD-10-CM

## 2023-08-02 DIAGNOSIS — I48.91 UNSPECIFIED ATRIAL FIBRILLATION: ICD-10-CM

## 2023-08-02 DIAGNOSIS — I34.0 NONRHEUMATIC MITRAL (VALVE) INSUFFICIENCY: ICD-10-CM

## 2023-08-02 PROCEDURE — 99205 OFFICE O/P NEW HI 60 MIN: CPT

## 2023-08-03 PROBLEM — I34.0 CHRONIC MITRAL VALVE REGURGITATION: Status: ACTIVE | Noted: 2023-08-03

## 2023-08-03 PROBLEM — I48.91 ATRIAL FIBRILLATION: Status: ACTIVE | Noted: 2023-07-05

## 2023-08-03 PROBLEM — R26.89 BALANCE PROBLEM: Status: ACTIVE | Noted: 2017-11-30

## 2023-08-03 NOTE — REVIEW OF SYSTEMS
[Limb Weakness (Paresis)] : limb weakness (Paresis) [Memory Lapses Or Loss] : memory lapses or loss [Negative] : Heme/Lymph [Weight Gain (___ Lbs)] : no recent weight gain [Feeling Fatigued] : not feeling fatigued [Weight Loss (___ Lbs)] : no recent weight loss [FreeTextEntry5] : Denies chest pain or shortness of breath.  Denies palpitations but definitely some atrial fibrillation in the hospital

## 2023-08-03 NOTE — CARDIOLOGY SUMMARY
[de-identified] : May 10, 2023 done elsewhere.  Technically difficult study.  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  MVA 2.20.  Calcified aortic valve with moderate stenosis.  PARMINDER 1.2 cmÂ².  Mild AI.  Normal LA size.  Mild LVH with normal LVEF of 52% and normal diastolic function.  Normal RV size and function.  No TR.  No pulmonary hypertension.  "Prominent probable mobile atheroma in the ascending aorta" this was a TTE.

## 2023-08-03 NOTE — ASSESSMENT
[FreeTextEntry1] : Very difficult but not uncommon issue here.  Syncopal episode may have been related to atrial fibrillation with a conversion pause, may have been a CVA, may have been just a fall as patient has no recollection.  While there was no evidence of bradycardia on all of her monitoring she clearly had a few episodes of rapid atrial fibrillation.  Currently in sinus rhythm on carvedilol along with his other medications.  LV function and RV function adequate with only moderate valvular disease.  Definite cerebellar stroke on CT but age indeterminant and they were not able to do an MRI because of her hardware from her hip surgery.  For what ever reason she is on Plavix currently.  Issues of stroke prevention in the setting of possible atrial fibrillation in an elderly person was fallen 1 or 2 times but at least not chronically.  Very long discussion with the patient and her friend who is her healthcare proxy and we agreed that I will discuss this with Dr. Mckay as well so that we can come to a consensus.  Plavix seems to have a fairly high bleeding risk especially intracerebral and may not be that much lower than anticoagulation with Eliquis which would be more appropriate given her episodic atrial fibrillation.  In addition I would consider putting her on amiodarone to try to keep her out of atrial fibrillation in case she does have further falls or bleeding issues that require her us avoiding anticoagulation.  At her age amiodarone would be preferable to doing a noninvasive cardiac work-up to see if she would be a candidate for flecainide although I doubt that would be the case anyway.

## 2023-08-03 NOTE — PHYSICAL EXAM
[Well Developed] : well developed [Well Nourished] : well nourished [No Acute Distress] : no acute distress [Normal Conjunctiva] : normal conjunctiva [Normal Venous Pressure] : normal venous pressure [No Carotid Bruit] : no carotid bruit [Normal S1, S2] : normal S1, S2 [No Rub] : no rub [No Gallop] : no gallop [Clear Lung Fields] : clear lung fields [Good Air Entry] : good air entry [No Respiratory Distress] : no respiratory distress  [Soft] : abdomen soft [Non Tender] : non-tender [No Masses/organomegaly] : no masses/organomegaly [Normal Bowel Sounds] : normal bowel sounds [Abnormal Gait] : abnormal gait [No Edema] : no edema [No Cyanosis] : no cyanosis [No Clubbing] : no clubbing [No Varicosities] : no varicosities [No Rash] : no rash [No Skin Lesions] : no skin lesions [Moves all extremities] : moves all extremities [Normal Speech] : normal speech [Alert and Oriented] : alert and oriented [de-identified] : Mild MR murmur, mild systolic and diastolic murmur at the aortic area.  No S3. [de-identified] : Abnormal gait probably some residual from old CVA. [de-identified] : Probably some focal defect [de-identified] : Seems like memory issues

## 2023-08-03 NOTE — REASON FOR VISIT
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

## 2023-08-03 NOTE — DISCUSSION/SUMMARY
[FreeTextEntry1] : My recommendation would be to stop Plavix, start Eliquis 2.5 twice daily, start amiodarone 200 mg daily.  I will review all this with Dr. Mckay they will come to a joint decision.  I would probably like to see her in 2 months almost regardless of what course is taken.

## 2023-08-03 NOTE — HISTORY OF PRESENT ILLNESS
[FreeTextEntry1] : August 2, 2023.  Patient here with a friend who is also her healthcare proxy.  Longtime patient of Dr. Mckay.  Reportedly no prior cardiac history.  Was found on the floor around the beginning of May and taken to [facility].  Some renal dysfunction.  Hypercalcemia.  CT showed old right cerebellar infarct or at least age-indeterminate.  MRI never done because of her hip replacement.  Mentioning of questions about atrial fibrillation but on reviewing rhythm strip and certainly one of the 2 EKGs she clearly was in atrial fibrillation with a rapid response at some point may be even atrial flutter.  She was not anticoagulated but instead managed with Plavix.  Blood pressure medicines were changed and carvedilol added and presumably no other episodes of atrial fibrillation were noted.  Echocardiogram seem to just show mild MS mild MR moderate AS mild AI concentric LVH with normal LV and RV systolic function.  Calcified arteries but no carotid obstruction and after being in the hospital for 1 week she was transferred to rehab where she was for about a month.  She now comes here for further evaluation.  She is alert and seems medically stable and cardiac wise stable as well.  In sinus rhythm. (There was a question of a possible pancreatic head lesion on CT that was evaluated by GI)

## 2023-09-17 LAB
ALBUMIN SERPL ELPH-MCNC: 4.2 G/DL
ALP BLD-CCNC: 67 U/L
ALT SERPL-CCNC: 13 U/L
AMYLASE/CREAT SERPL: 80 U/L
ANION GAP SERPL CALC-SCNC: 12 MMOL/L
AST SERPL-CCNC: 16 U/L
BILIRUB SERPL-MCNC: 0.2 MG/DL
BUN SERPL-MCNC: 46 MG/DL
CALCIUM SERPL-MCNC: 11.5 MG/DL
CALCIUM SERPL-MCNC: 11.5 MG/DL
CHLORIDE SERPL-SCNC: 103 MMOL/L
CHOLEST SERPL-MCNC: 146 MG/DL
CO2 SERPL-SCNC: 23 MMOL/L
CREAT SERPL-MCNC: 1.96 MG/DL
EGFR: 24 ML/MIN/1.73M2
ESTIMATED AVERAGE GLUCOSE: 114 MG/DL
GLUCOSE SERPL-MCNC: 102 MG/DL
HBA1C MFR BLD HPLC: 5.6 %
HDLC SERPL-MCNC: 46 MG/DL
LDLC SERPL CALC-MCNC: 70 MG/DL
LPL SERPL-CCNC: 39 U/L
NONHDLC SERPL-MCNC: 100 MG/DL
PARATHYROID HORMONE INTACT: 86 PG/ML
POTASSIUM SERPL-SCNC: 5 MMOL/L
PROT SERPL-MCNC: 6.7 G/DL
SODIUM SERPL-SCNC: 138 MMOL/L
T4 FREE SERPL-MCNC: 1.8 NG/DL
TRIGL SERPL-MCNC: 153 MG/DL
TSH SERPL-ACNC: 0.35 UIU/ML
VIT B12 SERPL-MCNC: 540 PG/ML

## 2023-10-09 ENCOUNTER — APPOINTMENT (OUTPATIENT)
Dept: INTERNAL MEDICINE | Facility: CLINIC | Age: 88
End: 2023-10-09
Payer: MEDICARE

## 2023-10-09 VITALS
HEART RATE: 70 BPM | WEIGHT: 129 LBS | BODY MASS INDEX: 24.35 KG/M2 | OXYGEN SATURATION: 97 % | TEMPERATURE: 97.3 F | HEIGHT: 61 IN

## 2023-10-09 DIAGNOSIS — R21 RASH AND OTHER NONSPECIFIC SKIN ERUPTION: ICD-10-CM

## 2023-10-09 DIAGNOSIS — E04.2 NONTOXIC MULTINODULAR GOITER: ICD-10-CM

## 2023-10-09 DIAGNOSIS — E21.3 HYPERPARATHYROIDISM, UNSPECIFIED: ICD-10-CM

## 2023-10-09 DIAGNOSIS — E21.0 PRIMARY HYPERPARATHYROIDISM: ICD-10-CM

## 2023-10-09 PROCEDURE — 90662 IIV NO PRSV INCREASED AG IM: CPT

## 2023-10-09 PROCEDURE — 36415 COLL VENOUS BLD VENIPUNCTURE: CPT

## 2023-10-09 PROCEDURE — G0008: CPT

## 2023-10-09 PROCEDURE — 99214 OFFICE O/P EST MOD 30 MIN: CPT | Mod: 25

## 2023-10-09 RX ORDER — NYSTATIN 100000 [USP'U]/G
100000 CREAM TOPICAL TWICE DAILY
Qty: 4 | Refills: 1 | Status: ACTIVE | COMMUNITY
Start: 2023-10-09 | End: 1900-01-01

## 2023-10-11 ENCOUNTER — APPOINTMENT (OUTPATIENT)
Dept: CARDIOLOGY | Facility: CLINIC | Age: 88
End: 2023-10-11
Payer: MEDICARE

## 2023-10-11 ENCOUNTER — NON-APPOINTMENT (OUTPATIENT)
Age: 88
End: 2023-10-11

## 2023-10-11 VITALS — HEART RATE: 66 BPM | SYSTOLIC BLOOD PRESSURE: 150 MMHG | DIASTOLIC BLOOD PRESSURE: 68 MMHG

## 2023-10-11 VITALS
SYSTOLIC BLOOD PRESSURE: 174 MMHG | DIASTOLIC BLOOD PRESSURE: 71 MMHG | HEIGHT: 61 IN | RESPIRATION RATE: 17 BRPM | OXYGEN SATURATION: 96 % | HEART RATE: 65 BPM | WEIGHT: 129 LBS | BODY MASS INDEX: 24.35 KG/M2

## 2023-10-11 DIAGNOSIS — I35.0 NONRHEUMATIC AORTIC (VALVE) STENOSIS: ICD-10-CM

## 2023-10-11 DIAGNOSIS — Z86.79 PERSONAL HISTORY OF OTHER DISEASES OF THE CIRCULATORY SYSTEM: ICD-10-CM

## 2023-10-11 PROCEDURE — 93306 TTE W/DOPPLER COMPLETE: CPT

## 2023-10-11 PROCEDURE — 93000 ELECTROCARDIOGRAM COMPLETE: CPT

## 2023-10-11 PROCEDURE — 99214 OFFICE O/P EST MOD 30 MIN: CPT | Mod: 25

## 2023-10-12 ENCOUNTER — RX RENEWAL (OUTPATIENT)
Age: 88
End: 2023-10-12

## 2023-10-13 ENCOUNTER — RX RENEWAL (OUTPATIENT)
Age: 88
End: 2023-10-13

## 2023-12-31 ENCOUNTER — NON-APPOINTMENT (OUTPATIENT)
Age: 88
End: 2023-12-31

## 2023-12-31 LAB
ALBUMIN SERPL ELPH-MCNC: 4.5 G/DL
ALP BLD-CCNC: 73 U/L
ALT SERPL-CCNC: 10 U/L
ANION GAP SERPL CALC-SCNC: 14 MMOL/L
AST SERPL-CCNC: 13 U/L
BILIRUB SERPL-MCNC: 0.3 MG/DL
BUN SERPL-MCNC: 50 MG/DL
CALCIUM SERPL-MCNC: 11.8 MG/DL
CHLORIDE SERPL-SCNC: 100 MMOL/L
CO2 SERPL-SCNC: 24 MMOL/L
CREAT SERPL-MCNC: 2.04 MG/DL
EGFR: 23 ML/MIN/1.73M2
FERRITIN SERPL-MCNC: 162 NG/ML
FOLATE SERPL-MCNC: 4.5 NG/ML
GLUCOSE SERPL-MCNC: 113 MG/DL
HCT VFR BLD CALC: 37.4 %
HGB BLD-MCNC: 11.8 G/DL
IRON SATN MFR SERPL: 27 %
IRON SERPL-MCNC: 77 UG/DL
MCHC RBC-ENTMCNC: 29.1 PG
MCHC RBC-ENTMCNC: 31.6 GM/DL
MCV RBC AUTO: 92.1 FL
PLATELET # BLD AUTO: 210 K/UL
POTASSIUM SERPL-SCNC: 4.6 MMOL/L
PROT SERPL-MCNC: 7 G/DL
RBC # BLD: 4.06 M/UL
RBC # FLD: 14.3 %
SODIUM SERPL-SCNC: 138 MMOL/L
T4 FREE SERPL-MCNC: 1.4 NG/DL
TIBC SERPL-MCNC: 286 UG/DL
TSH SERPL-ACNC: 0.65 UIU/ML
UIBC SERPL-MCNC: 209 UG/DL
WBC # FLD AUTO: 5.71 K/UL

## 2023-12-31 RX ORDER — LOSARTAN POTASSIUM 25 MG/1
25 TABLET, FILM COATED ORAL
Qty: 90 | Refills: 1 | Status: ACTIVE | COMMUNITY
Start: 2023-06-21 | End: 1900-01-01

## 2023-12-31 RX ORDER — CLOPIDOGREL BISULFATE 75 MG/1
75 TABLET, FILM COATED ORAL
Qty: 90 | Refills: 1 | Status: ACTIVE | COMMUNITY
Start: 2023-06-21 | End: 1900-01-01

## 2023-12-31 RX ORDER — LEVOTHYROXINE SODIUM 0.12 MG/1
125 TABLET ORAL
Qty: 90 | Refills: 1 | Status: ACTIVE | COMMUNITY
Start: 2022-09-22 | End: 1900-01-01

## 2023-12-31 NOTE — HISTORY OF PRESENT ILLNESS
[FreeTextEntry1] : The patient is here for a routine visit.  [de-identified] : Her appetite is good.  She is walking with a walker at home.  No falls.  She is sleeping well.    No chest pain or dyspnea.

## 2023-12-31 NOTE — PHYSICAL EXAM
[Normal] : soft, non-tender, non-distended, no masses palpated, no HSM and normal bowel sounds [de-identified] : 130/60

## 2023-12-31 NOTE — ASSESSMENT
[FreeTextEntry1] : She is managing at home.  I will speak to her friend, Mary, regarding her condition.  She likely has a fungal rash of the breasts and genital area- Nystatin prescribed.  She can see an ENT for her hearing.    She is seeing her cardiologist, Dr. Rendon, in two days.  Follow-up medications.    Flu vaccine today.  The BP is fine at 130/60.  Check an H/H, folate.  Monitor TFTs.  Monitor calcium.  Consider an endocrine evaluation.    COVID-19 vaccine and RSV vaccine advised.

## 2024-03-22 NOTE — HEALTH RISK ASSESSMENT
March 22, 2024     Jannette Serrano DO  [address] [place] [address]    Patient: Ricarda Galeas   YOB: 1940   Date of Visit: 3/22/2024       Dear Dr. Jannette Serrano DO:    Thank you for referring Ricarda Galeas to me for evaluation. Below are my notes for this consultation.  If you have questions, please do not hesitate to call me. I look forward to following your patient along with you.       Sincerely,     Harjit Gamboa MD      CC: No Recipients  ______________________________________________________________________________________    Subjective   Ricarda Galeas is a 83 y.o. female       Chief Complaint    Follow-up          HPI   Patient is in the office with her  and her daughter for follow-up for the problems noted below.  She has had no new complaints since her last visit and was seen once by electrophysiology October 2023.  She has chronic fatigue but denies any chest pain syncope or AICD discharge.  She has had no significant fluctuations in her weight.  Last visit I increased the midodrine up to 10 mg 3 times daily due to significant hypotension, currently she is hypertensive and has not been checking her blood pressure at home.  She has had no lab data since her last visit.  She had PFT and chest x-ray few weeks ago which I reviewed with her.  She seem to have no side effect of current medications    Assessment/recommendations:     1-coronary artery disease status post multivessel angioplasty in the past. Currently angina free and on maximally tolerated medical therapy. Attempted PCI of totally occluded LAD in 2019 failed  2-hyperlipidemia on statin therapy. Lipid profile is due and was ordered.  3-diabetes managed by PCP  4-diabetic autonomic dysfunction leading to orthostatic hypotension.  Presently she is hypertensive on maximal dose midodrine.  The dose will be reduced down to 5 mg 3 times daily and she will be back for blood pressure check.  5-History of  "hypothyroidism on replacement therapy.  Lab data were ordered  6-“status post biventricular AICD implantation , patient had battery change 2021, patient will continue to follow-up with electrophysiology Dr. Garcia at [facility]  7-ischemic cardiomyopathy. chronic systolic heart failure. She is stage C functional class II. Patient has not been able to tolerate standard heart failure therapy especially vasodilators due to hypotension caused by diabetic peripheral neuropathy. Last ejection fraction 30-35% 2019 echo  8-generalized fatigue and lack of stamina due to multiple factors including systolic heart failure   9-high risk medication with amiodarone . Amiodarone testing have been inconsequential,  10-paroxysmal ventricular tachycardia with no recurrences on amiodarone     Review of Systems   Cardiovascular:  Positive for palpitations.   All other systems reviewed and are negative.           Vitals:    03/22/24 0829   BP: 140/72   BP Location: Left arm   Patient Position: Sitting   Pulse: 70   Weight: 56.8 kg (125 lb 4.8 oz)   Height: 1.626 m (5' 4\")        Objective   Physical Exam  Constitutional:       Appearance: Normal appearance.   HENT:      Nose: Nose normal.   Neck:      Vascular: No carotid bruit.   Cardiovascular:      Rate and Rhythm: Normal rate.      Pulses: Normal pulses.      Heart sounds: Normal heart sounds.   Pulmonary:      Effort: Pulmonary effort is normal.   Abdominal:      General: Bowel sounds are normal.      Palpations: Abdomen is soft.   Musculoskeletal:         General: Normal range of motion.      Cervical back: Normal range of motion.      Right lower leg: No edema.      Left lower leg: No edema.   Skin:     General: Skin is warm and dry.   Neurological:      General: No focal deficit present.      Mental Status: She is alert.   Psychiatric:         Mood and Affect: Mood normal.         Behavior: Behavior normal.         Thought Content: Thought content normal.         " Judgment: Judgment normal.         Allergies  Beta-blockers (beta-adrenergic blocking agts), Penicillins, and Sulfa (sulfonamide antibiotics)     Current Medications    Current Outpatient Medications:   •  amiodarone (Pacerone) 200 mg tablet, TAKE 1 TABLET BY MOUTH DAILY, Disp: 90 tablet, Rfl: 3  •  aspirin 81 mg chewable tablet, Chew 1 tablet (81 mg) once daily., Disp: , Rfl:   •  aspirin 81 mg EC tablet, Take 1 tablet (81 mg) by mouth once daily., Disp: , Rfl:   •  atorvastatin (Lipitor) 20 mg tablet, Take 1 tablet (20 mg) by mouth once daily at bedtime., Disp: 90 tablet, Rfl: 3  •  calcium polycarbophiL (Fibercon) 625 mg tablet, Take 1 tablet (625 mg) by mouth once daily., Disp: , Rfl:   •  cyanocobalamin (Vitamin B-12) 1,000 mcg tablet, Take 1 tablet (1,000 mcg) by mouth once daily., Disp: , Rfl:   •  insulin detemir (Levemir FlexPen) 100 unit/mL (3 mL) pen, Inject under the skin once daily at bedtime., Disp: , Rfl:   •  insulin glargine (Lantus Solostar U-100 Insulin) 100 unit/mL (3 mL) pen, Inject under the skin., Disp: , Rfl:   •  insulin lispro (HumaLOG KwikPen Insulin) 100 unit/mL injection, Inject under the skin., Disp: , Rfl:   •  meclizine (Antivert) 12.5 mg tablet, Take by mouth., Disp: , Rfl:   •  metoclopramide (Reglan) 5 mg tablet, Take 1 tablet (5 mg) by mouth 4 times a day., Disp: , Rfl:   •  nitroglycerin (Nitrostat) 0.4 mg SL tablet, Place under the tongue., Disp: , Rfl:   •  pantoprazole (ProtoNix) 40 mg EC tablet, Take by mouth., Disp: , Rfl:   •  sertraline (Zoloft) 100 mg tablet, Take 1 tablet (100 mg) by mouth once daily., Disp: , Rfl:   •  midodrine (Proamatine) 5 mg tablet, Take 1 tablet (5 mg) by mouth 3 times a day., Disp: 90 tablet, Rfl: 0    Current Facility-Administered Medications:   •  fludrocortisone (Florinef) tablet 0.1 mg, 0.1 mg, oral, Once, Dieudonne Garcia MD                   EKG done in office today   Assessment/Plan   1. ASCVD (arteriosclerotic cardiovascular disease)   Follow Up In Cardiology      2. Essential hypertension  Follow Up In Cardiology      3. Paroxysmal atrial fibrillation (CMS/HCC)  ECG 12 Lead    Thyroid Stimulating Hormone    Thyroxine, Free    Triiodothyronine, Total      4. Paroxysmal ventricular tachycardia (CMS/HCC)  Basic Metabolic Panel    CBC      5. Cardiomyopathy, ischemic  Basic Metabolic Panel    CBC    Thyroid Stimulating Hormone      6. Biventricular AICD generator malfunction        7. High risk medication use  Basic Metabolic Panel    CBC    Thyroid Stimulating Hormone    Thyroxine, Free    Triiodothyronine, Total      8. Status post angioplasty with stent        9. Orthostatic hypotension  midodrine (Proamatine) 5 mg tablet      10. BMI 21.0-21.9, adult        11. Never smoked tobacco        12. Hyperlipidemia, unspecified hyperlipidemia type  Aspartate Aminotransferase    Alanine Aminotransferase    Lipid Panel      13. Insulin-requiring or dependent type II diabetes mellitus (CMS/HCC)                 Scribe Attestation  By signing my name below, mary WALTERS Scribe   attest that this documentation has been prepared under the direction and in the presence of Harjit Gamboa MD.     Provider Attestation - Scribe documentation    All medical record entries made by the Scribe were at my direction and personally dictated by me. I have reviewed the chart and agree that the record accurately reflects my personal performance of the history, physical exam, discussion and plan.    [No] : No [No falls in past year] : Patient reported no falls in the past year [0] : 2) Feeling down, depressed, or hopeless: Not at all (0) [Fully functional (bathing, dressing, toileting, transferring, walking, feeding)] : Fully functional (bathing, dressing, toileting, transferring, walking, feeding) [Fully functional (using the telephone, shopping, preparing meals, housekeeping, doing laundry, using] : Fully functional and needs no help or supervision to perform IADLs (using the telephone, shopping, preparing meals, housekeeping, doing laundry, using transportation, managing medications and managing finances) [] : No [[id number]] : 0 [Reports changes in dental health] : Reports no changes in dental health [Reports changes in vision] : Reports no changes in vision [Reports changes in hearing] : Reports no changes in hearing

## 2024-04-10 ENCOUNTER — RX RENEWAL (OUTPATIENT)
Age: 89
End: 2024-04-10

## 2024-04-10 RX ORDER — CARVEDILOL 6.25 MG/1
6.25 TABLET, FILM COATED ORAL
Qty: 90 | Refills: 1 | Status: ACTIVE | COMMUNITY
Start: 2023-05-15 | End: 1900-01-01

## 2024-04-18 ENCOUNTER — NON-APPOINTMENT (OUTPATIENT)
Age: 89
End: 2024-04-18

## 2024-04-25 ENCOUNTER — APPOINTMENT (OUTPATIENT)
Dept: INTERNAL MEDICINE | Facility: CLINIC | Age: 89
End: 2024-04-25
Payer: MEDICARE

## 2024-04-25 VITALS
TEMPERATURE: 98.2 F | HEART RATE: 74 BPM | BODY MASS INDEX: 24.73 KG/M2 | OXYGEN SATURATION: 96 % | HEIGHT: 61 IN | WEIGHT: 131 LBS

## 2024-04-25 VITALS — DIASTOLIC BLOOD PRESSURE: 70 MMHG | SYSTOLIC BLOOD PRESSURE: 122 MMHG

## 2024-04-25 DIAGNOSIS — E78.5 HYPERLIPIDEMIA, UNSPECIFIED: ICD-10-CM

## 2024-04-25 DIAGNOSIS — R41.89 OTHER SYMPTOMS AND SIGNS INVOLVING COGNITIVE FUNCTIONS AND AWARENESS: ICD-10-CM

## 2024-04-25 DIAGNOSIS — I10 ESSENTIAL (PRIMARY) HYPERTENSION: ICD-10-CM

## 2024-04-25 PROCEDURE — 99214 OFFICE O/P EST MOD 30 MIN: CPT | Mod: 25

## 2024-04-25 PROCEDURE — 36415 COLL VENOUS BLD VENIPUNCTURE: CPT

## 2024-04-25 RX ORDER — CLOTRIMAZOLE 10 MG/G
1 CREAM TOPICAL 3 TIMES DAILY
Qty: 1 | Refills: 3 | Status: ACTIVE | COMMUNITY
Start: 2024-04-25 | End: 1900-01-01

## 2024-04-25 NOTE — PHYSICAL EXAM
[Normal] : soft, non-tender, non-distended, no masses palpated, no HSM and normal bowel sounds [de-identified] : severe reddish rash under both breasts and surrounding skin.   [de-identified] : A+OX3, nonfocal.

## 2024-04-25 NOTE — HISTORY OF PRESENT ILLNESS
[de-identified] : The patient comes in with her friend, Mary.  Mary reports that the patient has been agitated at times but can also be lethargic.  She rarely showers and hygiene has been a problem.  She doesn't agree to her aide helping her.  She doesn't get out too much.  Appetite is good.  She is sleeping well.  She has an aide 12 hours per day seven days per week.

## 2024-04-25 NOTE — ASSESSMENT
[FreeTextEntry1] : The patient has been agitated at home and can be lethargic.  She is ok here in the office now.  She is nonfocal, awake, not lethargic and answers questions appropriately.  There might be an underlying slowly progressive cognitive difficulty.  She is also lip smacking suggestive of tardive dyskinesia.  I spoke to Mary and advised a neurology evaluation (the patient has been reluctant in the past).  Would like stop the Seroquel but will wait for the neurology consultation- concern over worsening agitation.  Note that she doesn't see a psychiatrist now.  She appears stable from a psychiatric standpoint.  Recheck blood tests including a calcium (hasn't seen an endocrinologist) and TFTs.  Check a CBC and metabolic.    She was encouraged to shower daily with her aide's help, get dressed every day and socialize and get out of the house more and she agrees.  She has a severe fungal infection.  She has previously been prescribed Nystatin but it's not clear how often she is taking it.  Start a Clotrimazole cream BID.  Consider a dermatology evaluation.

## 2024-06-25 ENCOUNTER — RX RENEWAL (OUTPATIENT)
Age: 89
End: 2024-06-25

## 2024-06-25 LAB
ALBUMIN SERPL ELPH-MCNC: 3.8 G/DL
ALP BLD-CCNC: 75 U/L
ALT SERPL-CCNC: 8 U/L
ANION GAP SERPL CALC-SCNC: 13 MMOL/L
AST SERPL-CCNC: 11 U/L
BILIRUB SERPL-MCNC: 0.2 MG/DL
BUN SERPL-MCNC: 33 MG/DL
CALCIUM SERPL-MCNC: 10.9 MG/DL
CALCIUM SERPL-MCNC: 10.9 MG/DL
CHLORIDE SERPL-SCNC: 101 MMOL/L
CHOLEST SERPL-MCNC: 143 MG/DL
CO2 SERPL-SCNC: 22 MMOL/L
CREAT SERPL-MCNC: 2.21 MG/DL
EGFR: 21 ML/MIN/1.73M2
ESTIMATED AVERAGE GLUCOSE: 123 MG/DL
GLUCOSE SERPL-MCNC: 98 MG/DL
HBA1C MFR BLD HPLC: 5.9 %
HCT VFR BLD CALC: 34 %
HDLC SERPL-MCNC: 40 MG/DL
HGB BLD-MCNC: 10.6 G/DL
LDLC SERPL CALC-MCNC: 78 MG/DL
MCHC RBC-ENTMCNC: 27.5 PG
MCHC RBC-ENTMCNC: 31.2 GM/DL
MCV RBC AUTO: 88.3 FL
NONHDLC SERPL-MCNC: 103 MG/DL
PARATHYROID HORMONE INTACT: 132 PG/ML
PLATELET # BLD AUTO: 298 K/UL
POTASSIUM SERPL-SCNC: 5.5 MMOL/L
PROT SERPL-MCNC: 6.8 G/DL
RBC # BLD: 3.85 M/UL
RBC # FLD: 13.1 %
SODIUM SERPL-SCNC: 137 MMOL/L
T4 FREE SERPL-MCNC: 1.7 NG/DL
TRIGL SERPL-MCNC: 142 MG/DL
TSH SERPL-ACNC: 0.17 UIU/ML
VIT B12 SERPL-MCNC: 611 PG/ML
WBC # FLD AUTO: 5.93 K/UL

## 2024-06-25 RX ORDER — ATORVASTATIN CALCIUM 40 MG/1
40 TABLET, FILM COATED ORAL
Qty: 90 | Refills: 1 | Status: ACTIVE | COMMUNITY
Start: 2023-05-15 | End: 1900-01-01

## 2024-07-01 ENCOUNTER — RX RENEWAL (OUTPATIENT)
Age: 89
End: 2024-07-01

## 2024-07-02 ENCOUNTER — RX RENEWAL (OUTPATIENT)
Age: 89
End: 2024-07-02

## 2024-07-09 ENCOUNTER — RX RENEWAL (OUTPATIENT)
Age: 89
End: 2024-07-09

## 2024-07-29 ENCOUNTER — NON-APPOINTMENT (OUTPATIENT)
Age: 89
End: 2024-07-29

## 2024-07-31 ENCOUNTER — NON-APPOINTMENT (OUTPATIENT)
Age: 89
End: 2024-07-31

## 2024-07-31 ENCOUNTER — RX RENEWAL (OUTPATIENT)
Age: 89
End: 2024-07-31

## 2024-07-31 RX ORDER — LOSARTAN POTASSIUM 25 MG/1
25 TABLET, FILM COATED ORAL
Qty: 90 | Refills: 3 | Status: ACTIVE | COMMUNITY
Start: 2024-07-31 | End: 1900-01-01

## 2024-07-31 RX ORDER — CLOPIDOGREL BISULFATE 75 MG/1
75 TABLET, FILM COATED ORAL
Qty: 90 | Refills: 3 | Status: ACTIVE | COMMUNITY
Start: 2024-07-31 | End: 1900-01-01

## 2024-08-01 ENCOUNTER — INPATIENT (INPATIENT)
Facility: HOSPITAL | Age: 89
LOS: 6 days | Discharge: NURSING, REHAB OR LONG-TERM CARE | DRG: 682 | End: 2024-08-08
Attending: STUDENT IN AN ORGANIZED HEALTH CARE EDUCATION/TRAINING PROGRAM | Admitting: STUDENT IN AN ORGANIZED HEALTH CARE EDUCATION/TRAINING PROGRAM
Payer: MEDICARE

## 2024-08-01 VITALS
RESPIRATION RATE: 19 BRPM | SYSTOLIC BLOOD PRESSURE: 142 MMHG | OXYGEN SATURATION: 93 % | HEART RATE: 66 BPM | TEMPERATURE: 98 F | DIASTOLIC BLOOD PRESSURE: 64 MMHG | WEIGHT: 119.93 LBS

## 2024-08-01 DIAGNOSIS — R29.6 REPEATED FALLS: ICD-10-CM

## 2024-08-01 LAB
ALBUMIN SERPL ELPH-MCNC: 4.1 G/DL — SIGNIFICANT CHANGE UP (ref 3.3–5)
ALP SERPL-CCNC: 84 U/L — SIGNIFICANT CHANGE UP (ref 40–120)
ALT FLD-CCNC: 9 U/L — LOW (ref 10–45)
ANION GAP SERPL CALC-SCNC: 15 MMOL/L — SIGNIFICANT CHANGE UP (ref 5–17)
AST SERPL-CCNC: 19 U/L — SIGNIFICANT CHANGE UP (ref 10–40)
BASOPHILS # BLD AUTO: 0.04 K/UL — SIGNIFICANT CHANGE UP (ref 0–0.2)
BASOPHILS NFR BLD AUTO: 0.4 % — SIGNIFICANT CHANGE UP (ref 0–2)
BILIRUB SERPL-MCNC: 0.5 MG/DL — SIGNIFICANT CHANGE UP (ref 0.2–1.2)
BUN SERPL-MCNC: 41 MG/DL — HIGH (ref 7–23)
CALCIUM SERPL-MCNC: 11.6 MG/DL — HIGH (ref 8.4–10.5)
CHLORIDE SERPL-SCNC: 106 MMOL/L — SIGNIFICANT CHANGE UP (ref 96–108)
CK SERPL-CCNC: 215 U/L — HIGH (ref 25–170)
CO2 SERPL-SCNC: 20 MMOL/L — LOW (ref 22–31)
CREAT SERPL-MCNC: 1.94 MG/DL — HIGH (ref 0.5–1.3)
EGFR: 24 ML/MIN/1.73M2 — LOW
EOSINOPHIL # BLD AUTO: 0.17 K/UL — SIGNIFICANT CHANGE UP (ref 0–0.5)
EOSINOPHIL NFR BLD AUTO: 1.7 % — SIGNIFICANT CHANGE UP (ref 0–6)
FLUAV AG NPH QL: SIGNIFICANT CHANGE UP
FLUBV AG NPH QL: SIGNIFICANT CHANGE UP
GLUCOSE SERPL-MCNC: 99 MG/DL — SIGNIFICANT CHANGE UP (ref 70–99)
HCT VFR BLD CALC: 35.9 % — SIGNIFICANT CHANGE UP (ref 34.5–45)
HGB BLD-MCNC: 11.2 G/DL — LOW (ref 11.5–15.5)
IMM GRANULOCYTES NFR BLD AUTO: 0.3 % — SIGNIFICANT CHANGE UP (ref 0–0.9)
LITHIUM SERPL-MCNC: 0.6 MMOL/L — SIGNIFICANT CHANGE UP (ref 0.6–1.2)
LYMPHOCYTES # BLD AUTO: 1.51 K/UL — SIGNIFICANT CHANGE UP (ref 1–3.3)
LYMPHOCYTES # BLD AUTO: 14.8 % — SIGNIFICANT CHANGE UP (ref 13–44)
MAGNESIUM SERPL-MCNC: 2.1 MG/DL — SIGNIFICANT CHANGE UP (ref 1.6–2.6)
MCHC RBC-ENTMCNC: 28.1 PG — SIGNIFICANT CHANGE UP (ref 27–34)
MCHC RBC-ENTMCNC: 31.2 GM/DL — LOW (ref 32–36)
MCV RBC AUTO: 90 FL — SIGNIFICANT CHANGE UP (ref 80–100)
MONOCYTES # BLD AUTO: 0.76 K/UL — SIGNIFICANT CHANGE UP (ref 0–0.9)
MONOCYTES NFR BLD AUTO: 7.5 % — SIGNIFICANT CHANGE UP (ref 2–14)
NEUTROPHILS # BLD AUTO: 7.69 K/UL — HIGH (ref 1.8–7.4)
NEUTROPHILS NFR BLD AUTO: 75.3 % — SIGNIFICANT CHANGE UP (ref 43–77)
NRBC # BLD: 0 /100 WBCS — SIGNIFICANT CHANGE UP (ref 0–0)
PHOSPHATE SERPL-MCNC: 3.2 MG/DL — SIGNIFICANT CHANGE UP (ref 2.5–4.5)
PLATELET # BLD AUTO: 213 K/UL — SIGNIFICANT CHANGE UP (ref 150–400)
POTASSIUM SERPL-MCNC: 4.2 MMOL/L — SIGNIFICANT CHANGE UP (ref 3.5–5.3)
POTASSIUM SERPL-SCNC: 4.2 MMOL/L — SIGNIFICANT CHANGE UP (ref 3.5–5.3)
PROT SERPL-MCNC: 7.4 G/DL — SIGNIFICANT CHANGE UP (ref 6–8.3)
RBC # BLD: 3.99 M/UL — SIGNIFICANT CHANGE UP (ref 3.8–5.2)
RBC # FLD: 14.4 % — SIGNIFICANT CHANGE UP (ref 10.3–14.5)
RSV RNA NPH QL NAA+NON-PROBE: SIGNIFICANT CHANGE UP
SARS-COV-2 RNA SPEC QL NAA+PROBE: SIGNIFICANT CHANGE UP
SODIUM SERPL-SCNC: 141 MMOL/L — SIGNIFICANT CHANGE UP (ref 135–145)
TROPONIN T, HIGH SENSITIVITY RESULT: 103 NG/L — HIGH (ref 0–51)
TROPONIN T, HIGH SENSITIVITY RESULT: 88 NG/L — HIGH (ref 0–51)
WBC # BLD: 10.2 K/UL — SIGNIFICANT CHANGE UP (ref 3.8–10.5)
WBC # FLD AUTO: 10.2 K/UL — SIGNIFICANT CHANGE UP (ref 3.8–10.5)

## 2024-08-01 PROCEDURE — 99223 1ST HOSP IP/OBS HIGH 75: CPT

## 2024-08-01 PROCEDURE — 70450 CT HEAD/BRAIN W/O DYE: CPT | Mod: 26,MC

## 2024-08-01 PROCEDURE — 93010 ELECTROCARDIOGRAM REPORT: CPT

## 2024-08-01 PROCEDURE — 99285 EMERGENCY DEPT VISIT HI MDM: CPT | Mod: GC

## 2024-08-01 PROCEDURE — 71045 X-RAY EXAM CHEST 1 VIEW: CPT | Mod: 26

## 2024-08-01 PROCEDURE — 72170 X-RAY EXAM OF PELVIS: CPT | Mod: 26

## 2024-08-01 RX ORDER — BACTERIOSTATIC SODIUM CHLORIDE 0.9 %
500 VIAL (ML) INJECTION ONCE
Refills: 0 | Status: COMPLETED | OUTPATIENT
Start: 2024-08-01 | End: 2024-08-01

## 2024-08-01 RX ADMIN — Medication 500 MILLILITER(S): at 20:30

## 2024-08-01 RX ADMIN — Medication 25 MILLIGRAM(S): at 18:09

## 2024-08-01 NOTE — H&P ADULT - MENTAL STATUS
Alert, calm.   Oriented to person/hospital.    Poor short term recall.   Mild paranoid ideation.  NO visual or auditory hallucinations.

## 2024-08-01 NOTE — H&P ADULT - PROBLEM SELECTOR PLAN 7
Transitions of Care Status:  1.  Name of PCP:    Clemente Mckay MD (PCP) [phone]  2.  PCP Contacted on Admission: [ ] Y    [ x] N    3.  PCP contacted at Discharge: [ ] Y    [ ] N    [ ] N/A  4.  Post-Discharge Appointment Date and Location:  5.  Summary of Handoff given to PCP:

## 2024-08-01 NOTE — H&P ADULT - PROBLEM SELECTOR PROBLEM 4
Patient was made aware that he does not need to have labs drawn before his EGD from our office. He should check with Dr. Gage's office to see if they had scheduled labs for him on 9-13 before his procedure due to him taking coumadin. He will call Dr. Gage's office to verify.    Elevated troponin

## 2024-08-01 NOTE — H&P ADULT - ASSESSMENT
Include Location In Plan?: No Detail Level: Simple Detail Level: Zone NIGHT HOSPITALIST NIGHT HOSPITALIST:    Brought in by EMS following a presumed mechanical fall at home in the setting of patient with mild cognitive impairment, reported bipolar disorder apparently is on lithium (unable to confirm from the office records, with lithium dating from 2014), essential HTN, hypothyroidism, with suspected troponin leak but also with acute kidney injury, presumably from patient's ARB.    Lithium becomes problematic with patient's azotemia and with the narrow therapeutic index, coupled with the patient's cognitive impairment and fluctuations in paranoia toward Hanita (identified in office records as patient's health care surrogate but unable to confirm and no contact number available to me at present.).    Received dose of Seroquel in the ER but patient agrees and would consider formal Psychiatry evaluation in the AM for assistance with management with patient's medications, as well as formal assessment of capacity.    Social work.    Will continue with patient's Norvasc and Coreg for HTN.        Will HOLD ARB due to acute kidney injury.    Renal US>    Upgraded to telemetry.   Suspect troponin leak.   Limited echo.  ON Plavix by PCP.    Will check TSH on Synthroid.    Doubt LEFT wrist fracture on clinical grounds, but x ray ordered.

## 2024-08-01 NOTE — H&P ADULT - PROBLEM/PLAN-2
TUBAL LIGATION  ? ??    many years ago        Social History     Socioeconomic History    Marital status: Legally      Spouse name: None    Number of children: None    Years of education: None    Highest education level: None   Tobacco Use    Smoking status: Former     Packs/day: 0.50     Types: Cigarettes     Quit date: 2009     Years since quittin.3    Smokeless tobacco: Never    Tobacco comments:     Quit smoking: quit in    Substance and Sexual Activity    Alcohol use: No    Drug use: No     Types: Prescription   Social History Narrative     from . Originally from [place].  Has lived in [facility] Has lived in this area since . No pets. Disabled from sarcoidosis. Lives with her children.          Previous Medications    ALBUTEROL (PROVENTIL) (5 MG/ML) 0.5% NEBULIZER SOLUTION    Take 1 mL by nebulization 4 times daily as needed for Wheezing    ALBUTEROL SULFATE HFA (VENTOLIN HFA) 108 (90 BASE) MCG/ACT INHALER    Inhale 2 puffs into the lungs 4 times daily    AMLODIPINE (NORVASC) 10 MG TABLET    Take 1 tablet by mouth daily    BENZONATATE (TESSALON) 100 MG CAPSULE    Take 1 capsule by mouth 3 times daily as needed for Cough    BUSPIRONE (BUSPAR) 7.5 MG TABLET    Take 1 tablet by mouth 3 times daily    DEXAMETHASONE (DECADRON) 6 MG TABLET    Take 1 tablet by mouth daily (with breakfast) for 7 days    FLUTICASONE-SALMETEROL (ADVAIR HFA) 230-21 MCG/ACT INHALER    Inhale 2 puffs into the lungs 2 times daily    GUAIFENESIN (MUCINEX) 600 MG EXTENDED RELEASE TABLET    Take 2 tablets by mouth 2 times daily    LIDOCAINE VISCOUS HCL (XYLOCAINE) 2 % SOLN SOLUTION    Take 5 mLs by mouth every 4 hours as needed    LISINOPRIL-HYDROCHLOROTHIAZIDE (PRINZIDE;ZESTORETIC) 20-12.5 MG PER TABLET    Take 1 tablet by mouth daily    LORAZEPAM (ATIVAN) 1 MG TABLET    1 tab po TID days 1-21 tab po BID days 3-41 tab po qAM days 5-61/2 tab po Days 7-8    MELOXICAM (MOBIC) 15 MG TABLET DISPLAY PLAN FREE TEXT

## 2024-08-01 NOTE — ED PROVIDER NOTE - PHYSICAL EXAMINATION
Const: Awake, alert, no acute distress.  Well appearing.  Moving comfortably on stretcher.  HEENT: NC/AT.  Moist mucous membranes.  No pharyngeal erythema, no exudates.  Eyes: Extraocular movements intact b/l.  Pupils equal, round, and reactive to light b/l.  Conjunctiva pink.  No scleral icterus.  Neck: Neck supple, full ROM without pain.  Cardiac: Regular rate and regular rhythm. S1 S2 present.  Peripheral pulses 2+ and symmetric.  No LE edema.  No chest wall tenderness, no overlying skin changes.  Resp: Speaking in full sentences. No evidence of respiratory distress.  Good air entry b/l, breath sounds clear to auscultation b/l.  Abd: Non-distended, no overlying skin changes.  Soft, non-tender, no guarding, no rigidity, no rebound tenderness.  No palpable masses.  MSK: Spine midline and non-tender, no paraspinal tenderness, no palpable deformities or overlying skin changes or open wounds. No CVAT. ecchymosis to posterior surface of L wrist, otherwise No palpable deformities or bony tenderness or ecchymosis or overlying skin changes to b/l UEs or LEs, FROM.  Skin: Normal coloration.  No rashes, abrasions or lacerations.  Neuro: Awake, alert & oriented x 3.  Moves all extremities spontaneously and symmetrically.  No focal deficits.

## 2024-08-01 NOTE — ED PROVIDER NOTE - PROGRESS NOTE DETAILS
Esau MATA), PGY-2: pt calm on initial interaction, pleasantly answering questions and allowing us to examen her, however pt became acutely upset during conversation, seemingly unprovoked, yelling she wants to be left alone so she can nap and refusing to answer any more questions, yelling at several staff members. Esau MATA), PGY-2: per chart review, pt with hx dementia and bipolar on lithium and seroquel, lives alone at home and has a daily care aid, however at times pt has refused to let the aid in or has called 911 on aid when they arrive to the house.

## 2024-08-01 NOTE — ED ADULT NURSE NOTE - NSFALLFACTORS_ED_ALL_ED
Caller: Franco Waters Jr.    Relationship: Self    Best call back number: [phone]    Requested Prescriptions:     Pharm[facility] where [id number] [address] AT [facility] & [address] [facility] [phone] [facility] [phone]      Last office visit with prescribing clinician: 5/1/2023   Last telemedicine visit with prescribing clinician: Visit date not found   Next office visit with prescribing clinician: 6/26/2023     Additional details provided by patient: THIS MEDICATION IS NOT GETTING RID OF FRANCO'S YEAST INFECTION.  IS THERE SOMETHING ELSE?    ketoconazole (NIZORAL) 2 % CREAM       Does the patient have less than a 3 day supply:  [] Yes  [] No    Would you like a call back once the refill request has been completed: [] Yes [] No    If the office needs to give you a call back, can they leave a voicemail: [] Yes [] No    Salma Chadwick   06/07/23 12:19 EDT     
recent fall/Other

## 2024-08-01 NOTE — ED PROVIDER NOTE - NSICDXPASTMEDICALHX_GEN_ALL_CORE_FT
PAST MEDICAL HISTORY:  Bipolar I disorder     Hyperlipidemia     Hypertension     Hypothyroid s/p r.thyroid lobectomy

## 2024-08-01 NOTE — H&P ADULT - NSHPLABSRESULTS_GEN_ALL_CORE
EKG tracing interpreted by me with NSR at 77 with QV1-V3 EKG tracing interpreted by me with NSR at 77 with QV1-V3    Chest radiograph interpreted by me with no infiltrate or effusion.    WBC 10.2  normal differential    Hgb 11.2    Platelets of 213K    HS troponin 103>> 88    Random glucose of 99    Cr 1.94  (last creatinine 1.25 in April 2014)    K+ 4.2    Mg++ 2.1    RVP screen negative.    CTT head negative.    Lithium level 0.60    Pelvic X ray with no fracture,   prior RIGHT hip arthroplasty.    LEFT wrist x ray ordered (NO tenderness on clinical exam and full range of motion)

## 2024-08-01 NOTE — ED PROVIDER NOTE - ATTENDING CONTRIBUTION TO CARE
89-year-old female history of hypertension hyperlipidemia hypothyroid and bipolar disorder on lithium and Seroquel presenting to the emergency room after fall at home today.  Patient is an unreliable historian.  She is unable to state how or why she fell.  She does state that she was able to scoot on her bottom to a phone and call 911.  She is not complaining of any chest pain, shortness of breath, back pain, neck pain, headache, weakness, numbness, tingling or infectious symptoms at present.  She denies pain.  She becomes agitated upon examination and questioning screaming out "why are you bothering me? I just want to take a nap."   She is unsure if she is on any blood thinners though chart review does not indicate that she is.    There is an outpatient chart note from her primary care doctor yesterday indicating that he was attempting to arrange outpatient follow-up.  Patient had been demonstrating paranoia as well as negative feelings towards Delfina who works for the home care agency.  The patient told me that Delfina is "stealing her money.  "She also states that he needed canceled her aids and she has been without aids for approximately the last week.  Chart documentation reflects that the patient has refused to let the aides in the house and called the police on the aids in the past.  Patient likely to require admission as unclear that she is able to provide a safe environment for herself presently.    There is no external evidence of trauma.  There is no midline CTL spine tenderness to palpation normal heart and lung exam abdomen soft chest wall without deformity pelvis stable full range of motion extremities x 4 no lacerations abrasions or ecchymosis noted.  She does have erythematous rash below her right breast consistent with a fungal infection.  She is awake alert and oriented x 3.  She does not have recall for the event however.  Will obtain screening labs and urinalysis as well as imaging to evaluate for any acute infectious etiology, toxic metabolic encephalopathy, acute traumatic injuries.

## 2024-08-01 NOTE — ED PROVIDER NOTE - OBJECTIVE STATEMENT
89y/o F pt with hx bipolar disorder on lithium, HTN, HLD, CKD, hypothyroidism, presenting into the ED s/p fall this AM.  Pt states she fell down to the ground, unknown how or why she fell.  She was unable to get up off the ground and scooted over to the phone to call 911.  Pt lives alone at home.

## 2024-08-01 NOTE — H&P ADULT - PROBLEM SELECTOR PLAN 1
Brought in by EMS following a presumed mechanical fall at home in the setting of patient with mild cognitive impairment, reported bipolar disorder apparently is on lithium (unable to confirm from the office records, with lithium dating from 2014), essential HTN, hypothyroidism, with suspected troponin leak but also with acute kidney injury, presumably from patient's ARB.    Lithium becomes problematic with patient's azotemia and with the narrow therapeutic index, coupled with the patient's cognitive impairment and fluctuations in paranoia toward Hanita (identified in office records as patient's health care surrogate but unable to confirm and no contact number available to me at present.).    Received dose of Seroquel in the ER but patient agrees and would consider formal Psychiatry evaluation in the AM for assistance with management with patient's medications, as well as formal assessment of capacity.    Social work.    Doubt LEFT wrist fracture on clinical grounds, but x ray ordered. Brought in by EMS following a presumed mechanical fall at home in the setting of patient with mild cognitive impairment, reported bipolar disorder apparently is on lithium (unable to confirm from the office records, with lithium dating from 2014), essential HTN, hypothyroidism, with suspected troponin leak but also with acute kidney injury, presumably from patient's ARB.    Lithium becomes problematic with patient's azotemia and with the narrow therapeutic index, coupled with the patient's cognitive impairment and fluctuations in paranoia toward Hanita (identified in office records as patient's health care surrogate but unable to confirm and no contact number available to me at present.).    Received dose of Seroquel in the ER but patient agrees.       Would consider formal Psychiatry evaluation in the AM for assistance with management with patient's medications, as well as formal assessment of capacity.    Social work.    Doubt LEFT wrist fracture on clinical grounds, but x ray ordered.

## 2024-08-01 NOTE — ED ADULT NURSE NOTE - NSFALLHARMRISKINTERV_ED_ALL_ED
Assistance OOB with selected safe patient handling equipment if applicable/Communicate risk of Fall with Harm to all staff, patient, and family/Provide visual cue: red socks, yellow wristband, yellow gown, etc/Reinforce activity limits and safety measures with patient and family/Bed in lowest position, wheels locked, appropriate side rails in place/Call bell, personal items and telephone in reach/Instruct patient to call for assistance before getting out of bed/chair/stretcher/Non-slip footwear applied when patient is off stretcher/[place] to call system/Physically safe environment - no spills, clutter or unnecessary equipment/Purposeful Proactive Rounding/Room/bathroom lighting operational, light cord in reach

## 2024-08-01 NOTE — H&P ADULT - NSHPADDITIONALINFOADULT_GEN_ALL_CORE
NIGHT HOSPITALIST:     Patient in agreement with above.    No family available for contact.   Given patient's comorbidities, patient's long term prognosis is guarded.   The patient is not yet ready to discuss advance directives.   Care reviewed with covering NP/PA for endorsement to the Daytime Provider.    Ignacio Mena MD  Available on [employer] Teams. NIGHT HOSPITALIST:     Patient in agreement with above.    No family available for contact.   Given patient's comorbidities, patient's long term prognosis is guarded.   The patient is not yet ready to discuss advance directives.   Care reviewed with covering NP/MARGE Vivas  for endorsement to the Daytime Provider.    Ignacio Mena MD  Available on [employer] Teams.

## 2024-08-01 NOTE — H&P ADULT - NSHPSOURCEINFOTX_GEN_ALL_CORE
Limited history from patient.  [facility]ex reviewed by me from last office visit PCP from April 25 2024.    [facility]  closed at this time.  Unable to contact emergency contact numbers available.

## 2024-08-01 NOTE — H&P ADULT - NSHPOUTPATIENTPROVIDERS_GEN_ALL_CORE
Clemente Mckay MD (PCP) [phone] Clemente Mckay MD (PCP) [phone]  Mendez Rendon MD(Cardiology) [phone]

## 2024-08-01 NOTE — H&P ADULT - NSHPREVIEWOFSYSTEMS_GEN_ALL_CORE
NO HA, no focal weakness.  NO chest pain/pressure.   NO palpitations.  NO cough, no dyspnoea. no wheezing.  NO breast symptoms.  NO abdominal pain, no red blood per rectum or melena.    NO fever, no chills, no rigors.  NO SI/HI  NO vaginal bleeding.  NO rash.  No anorexia.

## 2024-08-01 NOTE — ED ADULT NURSE NOTE - OBJECTIVE STATEMENT
89-year-old female PMH OF hypertension hyperlipidemia hypothyroid and bipolar disorder BIBA from home to the emergency room after fall at home today.  Patient is an unreliable historian.  and unable to state how or why she fell.   Pt denies chest pain, shortness of breath, back pain, neck pain, headache, weakness, numbness, tingling or infectious symptoms at present.

## 2024-08-01 NOTE — ED PROVIDER NOTE - CLINICAL SUMMARY MEDICAL DECISION MAKING FREE TEXT BOX
50
this is a 89y/o woman with hx CKD, dementia, bipolar, presenting for evaluation after fall today, pt unsure what happened, unable to ambulate after fall, lives alone at home with care aids however refusing to let them in on some days, pt well appearing in the ED, ecchymosis to L wrist however no additional signs of trauma, will check CT scan head given fall of unknown nature, XRs to evalaute for trauma, basic blood work including troponin, CK, eval infection with UA, viral swab, follow up results and reassess, at minimum pt to need admission for social needs to evaluate nursing home placement or escalation of home care.

## 2024-08-01 NOTE — H&P ADULT - HISTORY OF PRESENT ILLNESS
NIGHT HOSPITALIST:    Patient UNKNOWN to me previously, assigned to me at this point via the ER--patient followed by her PCP above--limited history from patient--office records from Dr. Mckay reviewed by me--patient is a 89 y/o retired  from the [employer] who lives alone but apparently has had intermittent home aides--review of office records with patient accompanied by a friend, Mary, during office visits (last to Dr. Mckay on April 25 2024) with patient reported on office note to have episodes of agitation, fluctuating with confusion, with apparently a deterioration in self care--patient with a history of cognitive impairment, reported bipolar on lithium, essential HTN, hypothyroidism--with office notes from Dr. Mckay from 7/30/24 and 7/31/24 with patient reported by Mary to be more agitated in the past week, apparently patient had contacted the police twice with patient thinking the aides are "stealing from her", with patient reporting to me that she has had a falling out with Charlesjudith (unclear to patient's Health Care Proxy or Power of ) with patient reporting she has a cousin in [place] and relatives in [facility].   Patient reports she slipped at home and was unable to get off the ground and crawled on the floor to call 911.    Patient denies HA< focal weakness.   NO hip pain, no wrist pain.   NO LOC.   NO chest pain/pressure.   NO palpitations.   NO N/V.   NO back pain, no tearing back pain.   NO fever, no chills, no rigors.    Patient with episode of agitation earlier in the ER, receiving Seroquel.  Now calm.   NIGHT HOSPITALIST:    Patient UNKNOWN to me previously, assigned to me at this point via the ER--patient followed by her PCP above--limited history from patient--office records from Dr. Mckay reviewed by me--patient is a 89 y/o retired  from the [employer] who lives alone but apparently has had intermittent home aides--review of office records with patient accompanied by a friend, Mary, during office visits (last to Dr. Mckay on April 25 2024) with patient reported on office note to have episodes of agitation, fluctuating with confusion, with apparently a deterioration in self care--patient with a history of cognitive impairment, reported bipolar on lithium, essential HTN, hypothyroidism--with office notes from Dr. Mckay from 7/30/24 and 7/31/24 with patient reported by Mary to be more agitated in the past week, apparently patient had contacted the police twice with patient thinking the aides are "stealing from her", with patient reporting to me that she has had a falling out with Charlesjudith (unclear to patient's Health Care Proxy or Power of ) with patient reporting she has a cousin in [place] and relatives in [facility].   Patient reports she slipped at home and was unable to get off the ground and crawled on the floor to call 911.    Patient denies HA< focal weakness.   NO hip pain, no wrist pain.   NO LOC.   NO chest pain/pressure.   NO palpitations.   NO N/V.   NO back pain, no tearing back pain.   NO fever, no chills, no rigors.    Patient with a hospitalization at [facility] in May 2023 with a course at rehab and an admission at [facility] in April 2014 for a confusional state.    Patient with episode of agitation earlier in the ER, receiving Seroquel.  Now calm.

## 2024-08-01 NOTE — H&P ADULT - PROBLEM/PLAN-6
Medication Question or Clarification  Who is calling: Patient  What medication are you calling about? (include dose and sig) Tamsulosin 0.4 mg, take one capsule daily after supper.    Who prescribed the medication?: Dr. Lowery  What is your question/concern?: Patient states that was advised to increase tamsulosin 0.4 mg to two capsules daily after supper because he had abnormal PSA.  He states he has been doing this for about one year.  Please clarify the correct dose, notify patient and send in appropriate prescription to [id number].  Pharmacy: [id number]  Okay to leave a detailed message?: Yes  Site CMT - Please call the pharmacy to obtain any additional needed information.   DISPLAY PLAN FREE TEXT

## 2024-08-02 ENCOUNTER — RESULT REVIEW (OUTPATIENT)
Age: 89
End: 2024-08-02

## 2024-08-02 DIAGNOSIS — Z96.641 PRESENCE OF RIGHT ARTIFICIAL HIP JOINT: Chronic | ICD-10-CM

## 2024-08-02 DIAGNOSIS — Z75.8 OTHER PROBLEMS RELATED TO MEDICAL FACILITIES AND OTHER HEALTH CARE: ICD-10-CM

## 2024-08-02 DIAGNOSIS — I10 ESSENTIAL (PRIMARY) HYPERTENSION: ICD-10-CM

## 2024-08-02 DIAGNOSIS — Z29.9 ENCOUNTER FOR PROPHYLACTIC MEASURES, UNSPECIFIED: ICD-10-CM

## 2024-08-02 DIAGNOSIS — R79.89 OTHER SPECIFIED ABNORMAL FINDINGS OF BLOOD CHEMISTRY: ICD-10-CM

## 2024-08-02 DIAGNOSIS — E03.9 HYPOTHYROIDISM, UNSPECIFIED: ICD-10-CM

## 2024-08-02 DIAGNOSIS — N17.9 ACUTE KIDNEY FAILURE, UNSPECIFIED: ICD-10-CM

## 2024-08-02 DIAGNOSIS — F44.89 OTHER DISSOCIATIVE AND CONVERSION DISORDERS: ICD-10-CM

## 2024-08-02 LAB
ANION GAP SERPL CALC-SCNC: 12 MMOL/L — SIGNIFICANT CHANGE UP (ref 5–17)
APPEARANCE UR: CLEAR — SIGNIFICANT CHANGE UP
BACTERIA # UR AUTO: NEGATIVE /HPF — SIGNIFICANT CHANGE UP
BASOPHILS # BLD AUTO: 0.03 K/UL — SIGNIFICANT CHANGE UP (ref 0–0.2)
BASOPHILS NFR BLD AUTO: 0.4 % — SIGNIFICANT CHANGE UP (ref 0–2)
BILIRUB UR-MCNC: NEGATIVE — SIGNIFICANT CHANGE UP
BUN SERPL-MCNC: 37 MG/DL — HIGH (ref 7–23)
CALCIUM SERPL-MCNC: 10.8 MG/DL — HIGH (ref 8.4–10.5)
CAST: 0 /LPF — SIGNIFICANT CHANGE UP (ref 0–4)
CHLORIDE SERPL-SCNC: 106 MMOL/L — SIGNIFICANT CHANGE UP (ref 96–108)
CO2 SERPL-SCNC: 21 MMOL/L — LOW (ref 22–31)
COLOR SPEC: YELLOW — SIGNIFICANT CHANGE UP
CREAT SERPL-MCNC: 1.82 MG/DL — HIGH (ref 0.5–1.3)
DIFF PNL FLD: NEGATIVE — SIGNIFICANT CHANGE UP
EGFR: 26 ML/MIN/1.73M2 — LOW
EOSINOPHIL # BLD AUTO: 0.27 K/UL — SIGNIFICANT CHANGE UP (ref 0–0.5)
EOSINOPHIL NFR BLD AUTO: 3.7 % — SIGNIFICANT CHANGE UP (ref 0–6)
GLUCOSE SERPL-MCNC: 116 MG/DL — HIGH (ref 70–99)
GLUCOSE UR QL: NEGATIVE MG/DL — SIGNIFICANT CHANGE UP
HCT VFR BLD CALC: 33.1 % — LOW (ref 34.5–45)
HGB BLD-MCNC: 10.4 G/DL — LOW (ref 11.5–15.5)
IMM GRANULOCYTES NFR BLD AUTO: 0.3 % — SIGNIFICANT CHANGE UP (ref 0–0.9)
KETONES UR-MCNC: NEGATIVE MG/DL — SIGNIFICANT CHANGE UP
LEUKOCYTE ESTERASE UR-ACNC: NEGATIVE — SIGNIFICANT CHANGE UP
LYMPHOCYTES # BLD AUTO: 1.62 K/UL — SIGNIFICANT CHANGE UP (ref 1–3.3)
LYMPHOCYTES # BLD AUTO: 22 % — SIGNIFICANT CHANGE UP (ref 13–44)
MCHC RBC-ENTMCNC: 27.7 PG — SIGNIFICANT CHANGE UP (ref 27–34)
MCHC RBC-ENTMCNC: 31.4 GM/DL — LOW (ref 32–36)
MCV RBC AUTO: 88 FL — SIGNIFICANT CHANGE UP (ref 80–100)
MONOCYTES # BLD AUTO: 0.83 K/UL — SIGNIFICANT CHANGE UP (ref 0–0.9)
MONOCYTES NFR BLD AUTO: 11.3 % — SIGNIFICANT CHANGE UP (ref 2–14)
NEUTROPHILS # BLD AUTO: 4.6 K/UL — SIGNIFICANT CHANGE UP (ref 1.8–7.4)
NEUTROPHILS NFR BLD AUTO: 62.3 % — SIGNIFICANT CHANGE UP (ref 43–77)
NITRITE UR-MCNC: NEGATIVE — SIGNIFICANT CHANGE UP
NRBC # BLD: 0 /100 WBCS — SIGNIFICANT CHANGE UP (ref 0–0)
PH UR: 7 — SIGNIFICANT CHANGE UP (ref 5–8)
PLATELET # BLD AUTO: 185 K/UL — SIGNIFICANT CHANGE UP (ref 150–400)
POTASSIUM SERPL-MCNC: 4.4 MMOL/L — SIGNIFICANT CHANGE UP (ref 3.5–5.3)
POTASSIUM SERPL-SCNC: 4.4 MMOL/L — SIGNIFICANT CHANGE UP (ref 3.5–5.3)
PROT UR-MCNC: 30 MG/DL
RBC # BLD: 3.76 M/UL — LOW (ref 3.8–5.2)
RBC # FLD: 14.3 % — SIGNIFICANT CHANGE UP (ref 10.3–14.5)
RBC CASTS # UR COMP ASSIST: 1 /HPF — SIGNIFICANT CHANGE UP (ref 0–4)
SODIUM SERPL-SCNC: 139 MMOL/L — SIGNIFICANT CHANGE UP (ref 135–145)
SP GR SPEC: 1.01 — SIGNIFICANT CHANGE UP (ref 1–1.03)
SQUAMOUS # UR AUTO: 0 /HPF — SIGNIFICANT CHANGE UP (ref 0–5)
TSH SERPL-MCNC: 0.69 UIU/ML — SIGNIFICANT CHANGE UP (ref 0.27–4.2)
UROBILINOGEN FLD QL: 0.2 MG/DL — SIGNIFICANT CHANGE UP (ref 0.2–1)
WBC # BLD: 7.37 K/UL — SIGNIFICANT CHANGE UP (ref 3.8–10.5)
WBC # FLD AUTO: 7.37 K/UL — SIGNIFICANT CHANGE UP (ref 3.8–10.5)
WBC UR QL: 0 /HPF — SIGNIFICANT CHANGE UP (ref 0–5)

## 2024-08-02 PROCEDURE — 93306 TTE W/DOPPLER COMPLETE: CPT | Mod: 26

## 2024-08-02 PROCEDURE — 99233 SBSQ HOSP IP/OBS HIGH 50: CPT

## 2024-08-02 PROCEDURE — 99222 1ST HOSP IP/OBS MODERATE 55: CPT

## 2024-08-02 RX ORDER — HEPARIN SODIUM 1000 [USP'U]/ML
5000 INJECTION, SOLUTION INTRAVENOUS; SUBCUTANEOUS EVERY 12 HOURS
Refills: 0 | Status: DISCONTINUED | OUTPATIENT
Start: 2024-08-02 | End: 2024-08-08

## 2024-08-02 RX ORDER — MELATONIN 3 MG
3 TABLET ORAL AT BEDTIME
Refills: 0 | Status: DISCONTINUED | OUTPATIENT
Start: 2024-08-02 | End: 2024-08-08

## 2024-08-02 RX ORDER — ATORVASTATIN CALCIUM 40 MG/1
40 TABLET, FILM COATED ORAL AT BEDTIME
Refills: 0 | Status: DISCONTINUED | OUTPATIENT
Start: 2024-08-02 | End: 2024-08-08

## 2024-08-02 RX ORDER — CLOPIDOGREL BISULFATE 75 MG/1
1 TABLET, FILM COATED ORAL
Refills: 0 | DISCHARGE

## 2024-08-02 RX ORDER — CITALOPRAM HYDROBROMIDE 20 MG
1 TABLET ORAL
Refills: 0 | DISCHARGE

## 2024-08-02 RX ORDER — CARVEDILOL PHOSPHATE 80 MG
1 CAPSULE,EXTENDED RELEASE MULTIPHASE 24HR ORAL
Refills: 0 | DISCHARGE

## 2024-08-02 RX ORDER — CARVEDILOL PHOSPHATE 80 MG
6.25 CAPSULE,EXTENDED RELEASE MULTIPHASE 24HR ORAL EVERY 12 HOURS
Refills: 0 | Status: DISCONTINUED | OUTPATIENT
Start: 2024-08-02 | End: 2024-08-08

## 2024-08-02 RX ORDER — ATORVASTATIN CALCIUM 40 MG/1
1 TABLET, FILM COATED ORAL
Refills: 0 | DISCHARGE

## 2024-08-02 RX ORDER — LEVOTHYROXINE SODIUM 175 MCG
125 TABLET ORAL DAILY
Refills: 0 | Status: DISCONTINUED | OUTPATIENT
Start: 2024-08-02 | End: 2024-08-08

## 2024-08-02 RX ORDER — AMLODIPINE BESYLATE 2.5 MG/1
10 TABLET ORAL DAILY
Refills: 0 | Status: DISCONTINUED | OUTPATIENT
Start: 2024-08-02 | End: 2024-08-08

## 2024-08-02 RX ORDER — CLOPIDOGREL BISULFATE 75 MG/1
75 TABLET, FILM COATED ORAL DAILY
Refills: 0 | Status: DISCONTINUED | OUTPATIENT
Start: 2024-08-02 | End: 2024-08-08

## 2024-08-02 RX ORDER — ACETAMINOPHEN 500 MG
650 TABLET ORAL EVERY 6 HOURS
Refills: 0 | Status: DISCONTINUED | OUTPATIENT
Start: 2024-08-02 | End: 2024-08-08

## 2024-08-02 RX ADMIN — Medication 650 MILLIGRAM(S): at 02:54

## 2024-08-02 RX ADMIN — ATORVASTATIN CALCIUM 40 MILLIGRAM(S): 40 TABLET, FILM COATED ORAL at 22:00

## 2024-08-02 RX ADMIN — AMLODIPINE BESYLATE 10 MILLIGRAM(S): 2.5 TABLET ORAL at 05:08

## 2024-08-02 RX ADMIN — Medication 650 MILLIGRAM(S): at 00:45

## 2024-08-02 RX ADMIN — Medication 1000 UNIT(S): at 13:13

## 2024-08-02 RX ADMIN — Medication 125 MICROGRAM(S): at 05:08

## 2024-08-02 RX ADMIN — Medication 3 MILLIGRAM(S): at 00:45

## 2024-08-02 RX ADMIN — Medication 6.25 MILLIGRAM(S): at 17:39

## 2024-08-02 RX ADMIN — Medication 6.25 MILLIGRAM(S): at 00:45

## 2024-08-02 RX ADMIN — ATORVASTATIN CALCIUM 40 MILLIGRAM(S): 40 TABLET, FILM COATED ORAL at 00:45

## 2024-08-02 RX ADMIN — CLOPIDOGREL BISULFATE 75 MILLIGRAM(S): 75 TABLET, FILM COATED ORAL at 13:13

## 2024-08-02 NOTE — PHYSICAL THERAPY INITIAL EVALUATION ADULT - NSPTDISCHREC_GEN_A_CORE
If pt d/c home, pt would require Home PT, assist with ALL mobility, 24/7 supervision & DME: rolling walker, polyfly wheelchair, 3:1 commode/Sub-acute Rehab

## 2024-08-02 NOTE — PHYSICAL THERAPY INITIAL EVALUATION ADULT - TRANSFER TRAINING, PT EVAL
GOAL: Pt will perform all transfers independently using the least restrictive assistive device in 2 weeks.

## 2024-08-02 NOTE — BH CONSULTATION LIAISON ASSESSMENT NOTE - NSBHCHARTREVIEWVS_PSY_A_CORE FT
Vital Signs Last 24 Hrs  T(C): 36.6 (02 Aug 2024 08:00), Max: 37.1 (01 Aug 2024 18:14)  T(F): 97.8 (02 Aug 2024 08:00), Max: 98.7 (01 Aug 2024 18:14)  HR: 66 (02 Aug 2024 08:00) (55 - 85)  BP: 138/69 (02 Aug 2024 08:00) (128/51 - 162/73)  BP(mean): --  RR: 18 (02 Aug 2024 08:00) (18 - 19)  SpO2: 90% (02 Aug 2024 08:00) (90% - 95%)    Parameters below as of 02 Aug 2024 08:00  Patient On (Oxygen Delivery Method): room air

## 2024-08-02 NOTE — PROGRESS NOTE ADULT - TIME BILLING
Time-based billing (NON-critical care).     The necessity of the time spent during the encounter on this date of service was due to:     - Ordering, reviewing, and interpreting labs, testing, and imaging.  - Independently obtaining a review of systems and performing a physical exam  - Reviewing prior hospitalization and where necessary, outpatient records.  - Counselling and educating patient  regarding interpretation of aforementioned items and plan of care.

## 2024-08-02 NOTE — BH CONSULTATION LIAISON ASSESSMENT NOTE - NICOTINE
The radiologist called and would like to speak to Dr Corrales regarding the resultsof Elvin's CT scan.    None known

## 2024-08-02 NOTE — PHYSICAL THERAPY INITIAL EVALUATION ADULT - GENERAL OBSERVATIONS, REHAB EVAL
Pt rec'd semisupine on stretcher, +IVL, +purewick, in NAD.  Pt cleared for PT session by RACHEL Messer.

## 2024-08-02 NOTE — PHYSICAL THERAPY INITIAL EVALUATION ADULT - IMPAIRMENTS CONTRIBUTING TO GAIT DEVIATIONS, PT EVAL
+pt reports weakness in BLE, requesting to sit after 5 ft/impaired balance/cognition/decreased strength

## 2024-08-02 NOTE — BH CONSULTATION LIAISON ASSESSMENT NOTE - HPI (INCLUDE ILLNESS QUALITY, SEVERITY, DURATION, TIMING, CONTEXT, MODIFYING FACTORS, ASSOCIATED SIGNS AND SYMPTOMS)
89 y/o F who is , domiciled alone in [place], has intermittent aids that come to help her, retired  from [employer], has a history of bipolar disorder and is not currently seeing a psychiatrist, with no known family history of psychiatric disorders, presented to the ED due to a fall at home. Psych consulted for agitation. Patient states that she fell while at home by herself and called the police. States the police "treated her like an idiot" because they took her to the ED when she told them she was fine. Patient states she has been without any aids for 2 weeks. Patient states her health care proxy, Mary, has been trying to steal her money. Patient states Mary called the agency that handles the aids and told them to stop sending aids to the patient's house. Patient states she has tried contacting the agency, but states the agency believes Mary and not her. Patient states that Mary is an evil woman. Patient states she needs the aids to come to her house in order to help her, states she has no one here to help her. Patient states that she has a cousin who lives in [place], but they do not keep in contact with each other. States she has a cousin in [facility], but has no other family members in the [place]. Patient states she never had any children, and that her  never wanted to adopt. Patient states she was seen by many psychiatrists in the past, but has not been seen by one in a while. States she doesn't remember any of the psychiatrists names. Patient states her PCP is Dr. Clemente Mckay, who she believes has been handling her psychiatric medications. Patient denies alcohol or substance use, SI, SA, HI, and thoughts of harming herself or other people. Patient denies any current feelings of depression.

## 2024-08-02 NOTE — BH CONSULTATION LIAISON ASSESSMENT NOTE - SUMMARY
89 y/o F who is , domiciled alone in [place], has intermittent aids that come to help her, retired  from [employer], has a history of bipolar disorder and is not currently seeing a psychiatrist, with no known family history of psychiatric disorders, presented to the ED due to a fall at home. Psych consulted for agitation. Patient states she has been without any aids for 2 weeks. Patient states her health care proxy, Mary, has been trying to steal her money. Patient states Mary called the agency that handles the aids and told them to stop sending aids to the patient's house. Patient states she has tried contacting the agency, but states the agency believes Mary and not her. Patient states that Mary is an evil woman.  Patient appears to be having paranoid thoughts in regards to her health care proxy, Mary. 89 y/o F who is , domiciled alone in [place], has intermittent aids that come to help her, retired  from [employer], has a history of bipolar disorder and is not currently seeing a psychiatrist, with no known family history of psychiatric disorders, presented to the ED due to a fall at home. Psych consulted for agitation. Patient states she has been without any aids for 2 weeks. Patient states her health care proxy, Mary, has been trying to steal her money. Patient states Mary called the agency that handles the aids and told them to stop sending aids to the patient's house. Patient states she has tried contacting the agency, but states the agency believes Mary and not her. Patient states that Mary is an evil woman. On interview, patient appeared calm and not confused.   Patient appears to be having paranoid thoughts in regards to her health care proxy, Mary. 89 y/o F who is , domiciled alone in [place], has intermittent aids that come to help her, retired  from [employer], has a history of bipolar disorder and is not currently seeing a psychiatrist, with no known family history of psychiatric disorders, presented to the ED due to a fall at home. Psych consulted for agitation. Patient states she has been without any aids for 2 weeks. Patient states her health care proxy, Mary, has been trying to steal her money. Patient states Mary called the agency that handles the aids and told them to stop sending aids to the patient's house. Patient states she has tried contacting the agency, but states the agency believes Mary and not her. Patient states that Mary is an evil woman. On interview, patient appeared calm and not confused.   Patient appears to be having paranoid thoughts in regards to her health care proxy, Mary.  Tried calling the emergency contact number listed in patient's chart for collateral, no answer left message.

## 2024-08-02 NOTE — BH CONSULTATION LIAISON ASSESSMENT NOTE - ADDITIONAL PSYCHIATRIC MEDICATIONS
Lithium 300 mg PO once a day   Citalopram 10 mg PO once a day   Quetiapine 50 mg PO once a day at bedtime

## 2024-08-02 NOTE — BH CONSULTATION LIAISON ASSESSMENT NOTE - NSBHROSSYSTEMS_PSY_ALL_CORE
PRELIMINARY EEG REPORT    EEG of the first 30 minutes was mildly abnormal due to excessive drowsiness versus mild encephalopathy, with EEG showing patient going in and out of sleep.  Brief arousals and background reactivity is seen with noxious stimuli.  No persistent focal asymmetry seen.  No epileptiform discharges or other epileptiform phenomena seen.  No seizures.  These findings may reflect mild nonspecific encephalopathy, normal effects of sedation or normal/expected responses to sedation.  Mild post ictal encephalopathy is not excluded.  Clinical correlation recommended.    Note: This is a prelimary report only. Full detailed report to follow.      Brayden Euceda MD  Epilepsy and General Neurology  Department of Neurology  Instructor of Clinical Neurology [facility] [employer] [employer] [facility].   Contact: [phone]    Psychiatric

## 2024-08-02 NOTE — BH CONSULTATION LIAISON ASSESSMENT NOTE - NSBHCHARTREVIEWLAB_PSY_A_CORE FT
11.2   10.20 )-----------( 213      ( 01 Aug 2024 18:19 )             35.9     08-01    141  |  106  |  41<H>  ----------------------------<  99  4.2   |  20<L>  |  1.94<H>    Ca    11.6<H>      01 Aug 2024 18:19  Phos  3.2     08-01  Mg     2.1     08-01    TPro  7.4  /  Alb  4.1  /  TBili  0.5  /  DBili  x   /  AST  19  /  ALT  9<L>  /  AlkPhos  84  08-01

## 2024-08-02 NOTE — PROGRESS NOTE ADULT - SUBJECTIVE AND OBJECTIVE BOX
[facility] Division of Hospital Medicine  Javy Bonilla DO  Pager (M-F, 8A-5P):  MS Teams PREFERRED        SUBJECTIVE / OVERNIGHT EVENTS:  Patient seen at bedside  Agitated overnight received Seroquel  Awaiting Psych evaluation.     MEDICATIONS  (STANDING):  amLODIPine   Tablet 10 milliGRAM(s) Oral daily  atorvastatin 40 milliGRAM(s) Oral at bedtime  carvedilol 6.25 milliGRAM(s) Oral every 12 hours  cholecalciferol 1000 Unit(s) Oral daily  clopidogrel Tablet 75 milliGRAM(s) Oral daily  heparin   Injectable 5000 Unit(s) SubCutaneous every 12 hours  levothyroxine 125 MICROGram(s) Oral daily    MEDICATIONS  (PRN):  acetaminophen     Tablet .. 650 milliGRAM(s) Oral every 6 hours PRN Temp greater or equal to 38C (100.4F), Mild Pain (1 - 3)  melatonin 3 milliGRAM(s) Oral at bedtime PRN Insomnia      I&O's Summary      PHYSICAL EXAM:  Vital Signs Last 24 Hrs  T(C): 36.6 (02 Aug 2024 08:00), Max: 37.1 (01 Aug 2024 18:14)  T(F): 97.8 (02 Aug 2024 08:00), Max: 98.7 (01 Aug 2024 18:14)  HR: 65 (02 Aug 2024 11:21) (55 - 85)  BP: 164/78 (02 Aug 2024 11:21) (128/51 - 164/78)  BP(mean): --  RR: 18 (02 Aug 2024 08:00) (18 - 19)  SpO2: 94% (02 Aug 2024 11:21) (92% - 95%)    Parameters below as of 02 Aug 2024 11:21  Patient On (Oxygen Delivery Method): room air      Physical Exam:   Alert to person/hospital  S1,S2, no murmurs rubs or gallops  Clear to auscultation bilaterally  Abdomen soft nondistended.     LABS:                        11.2   10.20 )-----------( 213      ( 01 Aug 2024 18:19 )             35.9     08-01    141  |  106  |  41<H>  ----------------------------<  99  4.2   |  20<L>  |  1.94<H>    Ca    11.6<H>      01 Aug 2024 18:19  Phos  3.2     08-01  Mg     2.1     08-01    TPro  7.4  /  Alb  4.1  /  TBili  0.5  /  DBili  x   /  AST  19  /  ALT  9<L>  /  AlkPhos  84  08-01      CARDIAC MARKERS ( 01 Aug 2024 18:19 )  x     / x     / 215 U/L / x     / x          Urinalysis Basic - ( 01 Aug 2024 18:19 )    Color: x / Appearance: x / SG: x / pH: x  Gluc: 99 mg/dL / Ketone: x  / Bili: x / Urobili: x   Blood: x / Protein: x / Nitrite: x   Leuk Esterase: x / RBC: x / WBC x   Sq Epi: x / Non Sq Epi: x / Bacteria: x          RADIOLOGY & ADDITIONAL TESTS:  Results Reviewed: CBC/CMP personally reviewed by me Hgb 11.2 Cr 1.94  Imaging Personally Reviewed:  Electrocardiogram Personally Reviewed:    COORDINATION OF CARE:  Care Discussed with Consultants/Other Providers [Y/N]: Y  Prior or Outpatient Records Reviewed [Y/N]: Y

## 2024-08-02 NOTE — PHYSICAL THERAPY INITIAL EVALUATION ADULT - PERTINENT HX OF CURRENT PROBLEM, REHAB EVAL
Pt is a 89 year old female with PMH significant for bipolar disorder on lithium, HTN, HLD, CKD, hypothyroidism.  Pt presents  s/p fall.  Pt states she fell down to the ground, unknown how or why she fell.  She was unable to get up off the ground and scooted over to the phone to call 911.  Pt lives alone at home.  Pt found to have suspected troponin leak and HERON.  Pt with ecchymosis to L wrist, pending XR, per PT orders, keep L UE NWB until XR results reviewed.  CXR(8/1): No focal consolidations. No acute traumatic findings.  XR Pelvis(8/1): No acute fracture. Right hip arthroplasty without evidence of   periprosthetic fracture. The lateral acetabular screw tip is proud along the lateral cortex.  CTH(8/1): No acute intracranial hemorrhage or acute territorial infarct.  If symptoms persist, follow-up MRI exam recommended.

## 2024-08-02 NOTE — BH CONSULTATION LIAISON ASSESSMENT NOTE - NSBHATTESTCOMMENTATTENDFT_PSY_A_CORE
89 y/o F who is , domiciled alone in [place], has intermittent aids that come to help her, retired  from [employer], has a history of bipolar disorder and is not currently seeing a psychiatrist, with no known family history of psychiatric disorders, presented to the ED due to a fall at home. Psych consulted for agitation. Patient states she has been without any aids for 2 weeks. Patient states her health care proxy, Mary, has been trying to steal her money. Patient states Mary called the agency that handles the aids and told them to stop sending aids to the patient's house. Patient states she has tried contacting the agency, but states the agency believes Mary and not her. Patient states that Mary is an evil woman. On interview, patient appeared calm and not confused.   Patient appears to be having paranoid thoughts in regards to her health care proxy, Mary.  rec resume home meds, can give seroquel prn haldol prn agitation

## 2024-08-02 NOTE — PHYSICAL THERAPY INITIAL EVALUATION ADULT - ADDITIONAL COMMENTS
Pt lives in a house alone with 2 JULIO and first floor set up, pt reports stairs to the 2nd floor and basement, but does not perform stairs at baseline.  PTA pt independent with ambulation using a rolling walker, pt requires aide assist for ADLs, pt reports her "aides were recently fired".  Pt has been having difficulty without aide assist.  Pt reports frequent falls.

## 2024-08-02 NOTE — BH CONSULTATION LIAISON ASSESSMENT NOTE - CURRENT MEDICATION
MEDICATIONS  (STANDING):  amLODIPine   Tablet 10 milliGRAM(s) Oral daily  atorvastatin 40 milliGRAM(s) Oral at bedtime  carvedilol 6.25 milliGRAM(s) Oral every 12 hours  cholecalciferol 1000 Unit(s) Oral daily  clopidogrel Tablet 75 milliGRAM(s) Oral daily  heparin   Injectable 5000 Unit(s) SubCutaneous every 12 hours  levothyroxine 125 MICROGram(s) Oral daily    MEDICATIONS  (PRN):  acetaminophen     Tablet .. 650 milliGRAM(s) Oral every 6 hours PRN Temp greater or equal to 38C (100.4F), Mild Pain (1 - 3)  melatonin 3 milliGRAM(s) Oral at bedtime PRN Insomnia

## 2024-08-03 LAB
CULTURE RESULTS: SIGNIFICANT CHANGE UP
SPECIMEN SOURCE: SIGNIFICANT CHANGE UP

## 2024-08-03 PROCEDURE — 99232 SBSQ HOSP IP/OBS MODERATE 35: CPT

## 2024-08-03 RX ORDER — LITHIUM CITRATE 8 MEQ/5 ML
300 SOLUTION, ORAL ORAL DAILY
Refills: 0 | Status: DISCONTINUED | OUTPATIENT
Start: 2024-08-03 | End: 2024-08-04

## 2024-08-03 RX ORDER — CITALOPRAM HYDROBROMIDE 20 MG
10 TABLET ORAL DAILY
Refills: 0 | Status: DISCONTINUED | OUTPATIENT
Start: 2024-08-03 | End: 2024-08-08

## 2024-08-03 RX ADMIN — Medication 6.25 MILLIGRAM(S): at 17:21

## 2024-08-03 RX ADMIN — Medication 6.25 MILLIGRAM(S): at 05:31

## 2024-08-03 RX ADMIN — Medication 1000 UNIT(S): at 12:39

## 2024-08-03 RX ADMIN — CLOPIDOGREL BISULFATE 75 MILLIGRAM(S): 75 TABLET, FILM COATED ORAL at 12:39

## 2024-08-03 RX ADMIN — AMLODIPINE BESYLATE 10 MILLIGRAM(S): 2.5 TABLET ORAL at 06:30

## 2024-08-03 RX ADMIN — Medication 50 MILLIGRAM(S): at 21:53

## 2024-08-03 RX ADMIN — HEPARIN SODIUM 5000 UNIT(S): 1000 INJECTION, SOLUTION INTRAVENOUS; SUBCUTANEOUS at 17:21

## 2024-08-03 RX ADMIN — HEPARIN SODIUM 5000 UNIT(S): 1000 INJECTION, SOLUTION INTRAVENOUS; SUBCUTANEOUS at 05:41

## 2024-08-03 RX ADMIN — Medication 125 MICROGRAM(S): at 05:30

## 2024-08-03 RX ADMIN — ATORVASTATIN CALCIUM 40 MILLIGRAM(S): 40 TABLET, FILM COATED ORAL at 21:53

## 2024-08-03 NOTE — PROGRESS NOTE ADULT - SUBJECTIVE AND OBJECTIVE BOX
Patient is a 89y old  Female who presents with a chief complaint of Brought by EMS for presumed mechanical fall at home. (02 Aug 2024 11:37)      SUBJECTIVE / OVERNIGHT EVENTS:    Tele reviewed:       ADDITIONAL REVIEW OF SYSTEMS: Negative except for above    MEDICATIONS  (STANDING):  amLODIPine   Tablet 10 milliGRAM(s) Oral daily  atorvastatin 40 milliGRAM(s) Oral at bedtime  carvedilol 6.25 milliGRAM(s) Oral every 12 hours  cholecalciferol 1000 Unit(s) Oral daily  clopidogrel Tablet 75 milliGRAM(s) Oral daily  heparin   Injectable 5000 Unit(s) SubCutaneous every 12 hours  levothyroxine 125 MICROGram(s) Oral daily    MEDICATIONS  (PRN):  acetaminophen     Tablet .. 650 milliGRAM(s) Oral every 6 hours PRN Temp greater or equal to 38C (100.4F), Mild Pain (1 - 3)  melatonin 3 milliGRAM(s) Oral at bedtime PRN Insomnia  QUEtiapine 25 milliGRAM(s) Oral every 6 hours PRN agitation      CAPILLARY BLOOD GLUCOSE        I&O's Summary    03 Aug 2024 07:01  -  03 Aug 2024 14:10  --------------------------------------------------------  IN: 0 mL / OUT: 150 mL / NET: -150 mL        PHYSICAL EXAM:  Vital Signs Last 24 Hrs  T(C): 36.3 (03 Aug 2024 10:06), Max: 36.9 (02 Aug 2024 17:19)  T(F): 97.3 (03 Aug 2024 10:06), Max: 98.4 (02 Aug 2024 17:19)  HR: 54 (03 Aug 2024 10:06) (54 - 74)  BP: 140/52 (03 Aug 2024 10:06) (122/64 - 165/85)  BP(mean): --  RR: 18 (03 Aug 2024 10:06) (18 - 18)  SpO2: 95% (03 Aug 2024 10:06) (91% - 95%)    Parameters below as of 03 Aug 2024 10:06  Patient On (Oxygen Delivery Method): room air        PHYSICAL EXAM:  GENERAL: NAD, well-developed  HEAD:  Atraumatic, Normocephalic  EYES:  conjunctiva and sclera clear  NECK: Supple, No JVD  CHEST/LUNG: Clear to auscultation bilaterally; No wheeze  HEART: Regular rate and rhythm; No murmurs, rubs, or gallops  ABDOMEN: Soft, Nontender, Nondistended; Bowel sounds present  EXTREMITIES:  2+ Peripheral Pulses, No clubbing, cyanosis, or edema  PSYCH: AAOx3  NEUROLOGY: non-focal  SKIN: No rashes or lesions      LABS:                        10.4   7.37  )-----------( 185      ( 02 Aug 2024 14:11 )             33.1     08-    139  |  106  |  37<H>  ----------------------------<  116<H>  4.4   |  21<L>  |  1.82<H>    Ca    10.8<H>      02 Aug 2024 14:11  Phos  3.2     08-  Mg     2.1     -    TPro  7.4  /  Alb  4.1  /  TBili  0.5  /  DBili  x   /  AST  19  /  ALT  9<L>  /  AlkPhos  84  08-      CARDIAC MARKERS ( 01 Aug 2024 18:19 )  x     / x     / 215 U/L / x     / x          Urinalysis Basic - ( 02 Aug 2024 15:00 )    Color: Yellow / Appearance: Clear / S.012 / pH: x  Gluc: x / Ketone: Negative mg/dL  / Bili: Negative / Urobili: 0.2 mg/dL   Blood: x / Protein: 30 mg/dL / Nitrite: Negative   Leuk Esterase: Negative / RBC: 1 /HPF / WBC 0 /HPF   Sq Epi: x / Non Sq Epi: 0 /HPF / Bacteria: Negative /HPF          RADIOLOGY & ADDITIONAL TESTS:    Imaging Personally Reviewed:    Electrocardiogram Personally Reviewed:    COORDINATION OF CARE:  Care Discussed with Consultants/Other Providers [Y/N]:  Prior or Outpatient Records Reviewed [Y/N]:     Patient is a 89y old  Female who presents with a chief complaint of Brought by EMS for presumed mechanical fall at home. (02 Aug 2024 11:37)      SUBJECTIVE / OVERNIGHT EVENTS:  NO complaints      MEDICATIONS  (STANDING):  amLODIPine   Tablet 10 milliGRAM(s) Oral daily  atorvastatin 40 milliGRAM(s) Oral at bedtime  carvedilol 6.25 milliGRAM(s) Oral every 12 hours  cholecalciferol 1000 Unit(s) Oral daily  clopidogrel Tablet 75 milliGRAM(s) Oral daily  heparin   Injectable 5000 Unit(s) SubCutaneous every 12 hours  levothyroxine 125 MICROGram(s) Oral daily    MEDICATIONS  (PRN):  acetaminophen     Tablet .. 650 milliGRAM(s) Oral every 6 hours PRN Temp greater or equal to 38C (100.4F), Mild Pain (1 - 3)  melatonin 3 milliGRAM(s) Oral at bedtime PRN Insomnia  QUEtiapine 25 milliGRAM(s) Oral every 6 hours PRN agitation      CAPILLARY BLOOD GLUCOSE        I&O's Summary    03 Aug 2024 07:01  -  03 Aug 2024 14:10  --------------------------------------------------------  IN: 0 mL / OUT: 150 mL / NET: -150 mL        PHYSICAL EXAM:  Vital Signs Last 24 Hrs  T(C): 36.3 (03 Aug 2024 10:06), Max: 36.9 (02 Aug 2024 17:19)  T(F): 97.3 (03 Aug 2024 10:06), Max: 98.4 (02 Aug 2024 17:19)  HR: 54 (03 Aug 2024 10:06) (54 - 74)  BP: 140/52 (03 Aug 2024 10:06) (122/64 - 165/85)  BP(mean): --  RR: 18 (03 Aug 2024 10:06) (18 - 18)  SpO2: 95% (03 Aug 2024 10:06) (91% - 95%)    Parameters below as of 03 Aug 2024 10:06  Patient On (Oxygen Delivery Method): room air        PHYSICAL EXAM:  GENERAL: NAD, well-developed  CHEST/LUNG: Clear to auscultation bilaterally; No wheeze  HEART: Regular rate and rhythm; No murmurs, rubs, or gallops  ABDOMEN: Soft, Nontender, Nondistended; Bowel sounds present        LABS:                        10.4   7.37  )-----------( 185      ( 02 Aug 2024 14:11 )             33.1     08-02    139  |  106  |  37<H>  ----------------------------<  116<H>  4.4   |  21<L>  |  1.82<H>    Ca    10.8<H>      02 Aug 2024 14:11  Phos  3.2     08  Mg     2.1     08    TPro  7.4  /  Alb  4.1  /  TBili  0.5  /  DBili  x   /  AST  19  /  ALT  9<L>  /  AlkPhos  84  08      CARDIAC MARKERS ( 01 Aug 2024 18:19 )  x     / x     / 215 U/L / x     / x          Urinalysis Basic - ( 02 Aug 2024 15:00 )    Color: Yellow / Appearance: Clear / S.012 / pH: x  Gluc: x / Ketone: Negative mg/dL  / Bili: Negative / Urobili: 0.2 mg/dL   Blood: x / Protein: 30 mg/dL / Nitrite: Negative   Leuk Esterase: Negative / RBC: 1 /HPF / WBC 0 /HPF   Sq Epi: x / Non Sq Epi: 0 /HPF / Bacteria: Negative /HPF          RADIOLOGY & ADDITIONAL TESTS:    Imaging Personally Reviewed:    Electrocardiogram Personally Reviewed:    COORDINATION OF CARE:  Care Discussed with Consultants/Other Providers [Y/N]:  Prior or Outpatient Records Reviewed [Y/N]:

## 2024-08-04 LAB
ALBUMIN SERPL ELPH-MCNC: 3.7 G/DL — SIGNIFICANT CHANGE UP (ref 3.3–5)
ALP SERPL-CCNC: 71 U/L — SIGNIFICANT CHANGE UP (ref 40–120)
ALT FLD-CCNC: 9 U/L — LOW (ref 10–45)
ANION GAP SERPL CALC-SCNC: 11 MMOL/L — SIGNIFICANT CHANGE UP (ref 5–17)
AST SERPL-CCNC: 7 U/L — LOW (ref 10–40)
BASOPHILS # BLD AUTO: 0.03 K/UL — SIGNIFICANT CHANGE UP (ref 0–0.2)
BASOPHILS NFR BLD AUTO: 0.4 % — SIGNIFICANT CHANGE UP (ref 0–2)
BILIRUB SERPL-MCNC: 0.3 MG/DL — SIGNIFICANT CHANGE UP (ref 0.2–1.2)
BUN SERPL-MCNC: 30 MG/DL — HIGH (ref 7–23)
CALCIUM SERPL-MCNC: 11.3 MG/DL — HIGH (ref 8.4–10.5)
CHLORIDE SERPL-SCNC: 106 MMOL/L — SIGNIFICANT CHANGE UP (ref 96–108)
CO2 SERPL-SCNC: 22 MMOL/L — SIGNIFICANT CHANGE UP (ref 22–31)
CREAT ?TM UR-MCNC: 40 MG/DL — SIGNIFICANT CHANGE UP
CREAT SERPL-MCNC: 1.62 MG/DL — HIGH (ref 0.5–1.3)
EGFR: 30 ML/MIN/1.73M2 — LOW
EOSINOPHIL # BLD AUTO: 0.22 K/UL — SIGNIFICANT CHANGE UP (ref 0–0.5)
EOSINOPHIL NFR BLD AUTO: 3.2 % — SIGNIFICANT CHANGE UP (ref 0–6)
GLUCOSE SERPL-MCNC: 132 MG/DL — HIGH (ref 70–99)
HCT VFR BLD CALC: 37.7 % — SIGNIFICANT CHANGE UP (ref 34.5–45)
HGB BLD-MCNC: 11.8 G/DL — SIGNIFICANT CHANGE UP (ref 11.5–15.5)
IMM GRANULOCYTES NFR BLD AUTO: 0.3 % — SIGNIFICANT CHANGE UP (ref 0–0.9)
LYMPHOCYTES # BLD AUTO: 1.82 K/UL — SIGNIFICANT CHANGE UP (ref 1–3.3)
LYMPHOCYTES # BLD AUTO: 26.8 % — SIGNIFICANT CHANGE UP (ref 13–44)
MCHC RBC-ENTMCNC: 27.8 PG — SIGNIFICANT CHANGE UP (ref 27–34)
MCHC RBC-ENTMCNC: 31.3 GM/DL — LOW (ref 32–36)
MCV RBC AUTO: 88.9 FL — SIGNIFICANT CHANGE UP (ref 80–100)
MONOCYTES # BLD AUTO: 0.69 K/UL — SIGNIFICANT CHANGE UP (ref 0–0.9)
MONOCYTES NFR BLD AUTO: 10.2 % — SIGNIFICANT CHANGE UP (ref 2–14)
NEUTROPHILS # BLD AUTO: 4.01 K/UL — SIGNIFICANT CHANGE UP (ref 1.8–7.4)
NEUTROPHILS NFR BLD AUTO: 59.1 % — SIGNIFICANT CHANGE UP (ref 43–77)
NRBC # BLD: 0 /100 WBCS — SIGNIFICANT CHANGE UP (ref 0–0)
PLATELET # BLD AUTO: 213 K/UL — SIGNIFICANT CHANGE UP (ref 150–400)
POTASSIUM SERPL-MCNC: 4.5 MMOL/L — SIGNIFICANT CHANGE UP (ref 3.5–5.3)
POTASSIUM SERPL-SCNC: 4.5 MMOL/L — SIGNIFICANT CHANGE UP (ref 3.5–5.3)
PROT SERPL-MCNC: 7 G/DL — SIGNIFICANT CHANGE UP (ref 6–8.3)
RBC # BLD: 4.24 M/UL — SIGNIFICANT CHANGE UP (ref 3.8–5.2)
RBC # FLD: 14.1 % — SIGNIFICANT CHANGE UP (ref 10.3–14.5)
SODIUM SERPL-SCNC: 139 MMOL/L — SIGNIFICANT CHANGE UP (ref 135–145)
SODIUM UR-SCNC: 49 MMOL/L — SIGNIFICANT CHANGE UP
UUN UR-MCNC: 341 MG/DL — SIGNIFICANT CHANGE UP
WBC # BLD: 6.79 K/UL — SIGNIFICANT CHANGE UP (ref 3.8–10.5)
WBC # FLD AUTO: 6.79 K/UL — SIGNIFICANT CHANGE UP (ref 3.8–10.5)

## 2024-08-04 PROCEDURE — 99232 SBSQ HOSP IP/OBS MODERATE 35: CPT

## 2024-08-04 RX ORDER — LITHIUM CITRATE 8 MEQ/5 ML
300 SOLUTION, ORAL ORAL DAILY
Refills: 0 | Status: DISCONTINUED | OUTPATIENT
Start: 2024-08-04 | End: 2024-08-04

## 2024-08-04 RX ORDER — BACTERIOSTATIC SODIUM CHLORIDE 0.9 %
500 VIAL (ML) INJECTION ONCE
Refills: 0 | Status: COMPLETED | OUTPATIENT
Start: 2024-08-04 | End: 2024-08-04

## 2024-08-04 RX ADMIN — HEPARIN SODIUM 5000 UNIT(S): 1000 INJECTION, SOLUTION INTRAVENOUS; SUBCUTANEOUS at 17:53

## 2024-08-04 RX ADMIN — ATORVASTATIN CALCIUM 40 MILLIGRAM(S): 40 TABLET, FILM COATED ORAL at 22:19

## 2024-08-04 RX ADMIN — Medication 1000 UNIT(S): at 11:48

## 2024-08-04 RX ADMIN — AMLODIPINE BESYLATE 10 MILLIGRAM(S): 2.5 TABLET ORAL at 06:34

## 2024-08-04 RX ADMIN — Medication 50 MILLIGRAM(S): at 22:19

## 2024-08-04 RX ADMIN — Medication 10 MILLIGRAM(S): at 11:47

## 2024-08-04 RX ADMIN — CLOPIDOGREL BISULFATE 75 MILLIGRAM(S): 75 TABLET, FILM COATED ORAL at 11:47

## 2024-08-04 RX ADMIN — Medication 125 MICROGRAM(S): at 06:34

## 2024-08-04 RX ADMIN — Medication 6.25 MILLIGRAM(S): at 16:25

## 2024-08-04 RX ADMIN — Medication 1000 MILLILITER(S): at 13:22

## 2024-08-04 NOTE — PROGRESS NOTE ADULT - SUBJECTIVE AND OBJECTIVE BOX
Patient is a 89y old  Female who presents with a chief complaint of Brought by EMS for presumed mechanical fall at home. (03 Aug 2024 14:09)      SUBJECTIVE / OVERNIGHT EVENTS:    Tele reviewed:       ADDITIONAL REVIEW OF SYSTEMS: Negative except for above    MEDICATIONS  (STANDING):  amLODIPine   Tablet 10 milliGRAM(s) Oral daily  atorvastatin 40 milliGRAM(s) Oral at bedtime  carvedilol 6.25 milliGRAM(s) Oral every 12 hours  cholecalciferol 1000 Unit(s) Oral daily  citalopram 10 milliGRAM(s) Oral daily  clopidogrel Tablet 75 milliGRAM(s) Oral daily  heparin   Injectable 5000 Unit(s) SubCutaneous every 12 hours  levothyroxine 125 MICROGram(s) Oral daily  lithium 300 milliGRAM(s) Oral daily  QUEtiapine 50 milliGRAM(s) Oral at bedtime  sodium chloride 0.9% Bolus 500 milliLiter(s) IV Bolus once    MEDICATIONS  (PRN):  acetaminophen     Tablet .. 650 milliGRAM(s) Oral every 6 hours PRN Temp greater or equal to 38C (100.4F), Mild Pain (1 - 3)  melatonin 3 milliGRAM(s) Oral at bedtime PRN Insomnia  QUEtiapine 25 milliGRAM(s) Oral every 6 hours PRN agitation      CAPILLARY BLOOD GLUCOSE        I&O's Summary    03 Aug 2024 07:01  -  04 Aug 2024 07:00  --------------------------------------------------------  IN: 0 mL / OUT: 1150 mL / NET: -1150 mL        PHYSICAL EXAM:  Vital Signs Last 24 Hrs  T(C): 36.6 (04 Aug 2024 04:57), Max: 36.7 (03 Aug 2024 20:02)  T(F): 97.8 (04 Aug 2024 04:57), Max: 98.1 (03 Aug 2024 20:02)  HR: 51 (04 Aug 2024 04:57) (51 - 65)  BP: 142/61 (04 Aug 2024 04:57) (140/64 - 159/74)  BP(mean): --  RR: 18 (04 Aug 2024 04:57) (18 - 19)  SpO2: 91% (04 Aug 2024 04:57) (91% - 96%)    Parameters below as of 03 Aug 2024 20:02  Patient On (Oxygen Delivery Method): room air        PHYSICAL EXAM:  GENERAL: NAD, well-developed  HEAD:  Atraumatic, Normocephalic  EYES:  conjunctiva and sclera clear  NECK: Supple, No JVD  CHEST/LUNG: Clear to auscultation bilaterally; No wheeze  HEART: Regular rate and rhythm; No murmurs, rubs, or gallops  ABDOMEN: Soft, Nontender, Nondistended; Bowel sounds present  EXTREMITIES:  2+ Peripheral Pulses, No clubbing, cyanosis, or edema  PSYCH: AAOx3  NEUROLOGY: non-focal  SKIN: No rashes or lesions      LABS:                        11.8   6.79  )-----------( 213      ( 04 Aug 2024 11:51 )             37.7     08-04    139  |  106  |  30<H>  ----------------------------<  132<H>  4.5   |  22  |  1.62<H>    Ca    11.3<H>      04 Aug 2024 11:51    TPro  7.0  /  Alb  3.7  /  TBili  0.3  /  DBili  x   /  AST  7<L>  /  ALT  9<L>  /  AlkPhos  71  08-04          Urinalysis Basic - ( 04 Aug 2024 11:51 )    Color: x / Appearance: x / SG: x / pH: x  Gluc: 132 mg/dL / Ketone: x  / Bili: x / Urobili: x   Blood: x / Protein: x / Nitrite: x   Leuk Esterase: x / RBC: x / WBC x   Sq Epi: x / Non Sq Epi: x / Bacteria: x        Culture - Urine (collected 02 Aug 2024 15:00)  Source: Clean Catch Clean Catch (Midstream)  Final Report (03 Aug 2024 14:41):    <10,000 CFU/mL Normal Urogenital [place]        RADIOLOGY & ADDITIONAL TESTS:    Imaging Personally Reviewed:    Electrocardiogram Personally Reviewed:    COORDINATION OF CARE:  Care Discussed with Consultants/Other Providers [Y/N]:  Prior or Outpatient Records Reviewed [Y/N]:     Patient is a 89y old  Female who presents with a chief complaint of Brought by EMS for presumed mechanical fall at home. (03 Aug 2024 14:09)      SUBJECTIVE / OVERNIGHT EVENTS:  NO overnight events       MEDICATIONS  (STANDING):  amLODIPine   Tablet 10 milliGRAM(s) Oral daily  atorvastatin 40 milliGRAM(s) Oral at bedtime  carvedilol 6.25 milliGRAM(s) Oral every 12 hours  cholecalciferol 1000 Unit(s) Oral daily  citalopram 10 milliGRAM(s) Oral daily  clopidogrel Tablet 75 milliGRAM(s) Oral daily  heparin   Injectable 5000 Unit(s) SubCutaneous every 12 hours  levothyroxine 125 MICROGram(s) Oral daily  lithium 300 milliGRAM(s) Oral daily  QUEtiapine 50 milliGRAM(s) Oral at bedtime  sodium chloride 0.9% Bolus 500 milliLiter(s) IV Bolus once    MEDICATIONS  (PRN):  acetaminophen     Tablet .. 650 milliGRAM(s) Oral every 6 hours PRN Temp greater or equal to 38C (100.4F), Mild Pain (1 - 3)  melatonin 3 milliGRAM(s) Oral at bedtime PRN Insomnia  QUEtiapine 25 milliGRAM(s) Oral every 6 hours PRN agitation      CAPILLARY BLOOD GLUCOSE        I&O's Summary    03 Aug 2024 07:01  -  04 Aug 2024 07:00  --------------------------------------------------------  IN: 0 mL / OUT: 1150 mL / NET: -1150 mL        PHYSICAL EXAM:  Vital Signs Last 24 Hrs  T(C): 36.6 (04 Aug 2024 04:57), Max: 36.7 (03 Aug 2024 20:02)  T(F): 97.8 (04 Aug 2024 04:57), Max: 98.1 (03 Aug 2024 20:02)  HR: 51 (04 Aug 2024 04:57) (51 - 65)  BP: 142/61 (04 Aug 2024 04:57) (140/64 - 159/74)  BP(mean): --  RR: 18 (04 Aug 2024 04:57) (18 - 19)  SpO2: 91% (04 Aug 2024 04:57) (91% - 96%)    Parameters below as of 03 Aug 2024 20:02  Patient On (Oxygen Delivery Method): room air        PHYSICAL EXAM:  GENERAL: NAD, well-developed  CHEST/LUNG: Clear to auscultation bilaterally  HEART: Regular rate and rhythm  ABDOMEN: Soft, Nontender, Nondistended; Bowel sounds present      LABS:                        11.8   6.79  )-----------( 213      ( 04 Aug 2024 11:51 )             37.7     08-04    139  |  106  |  30<H>  ----------------------------<  132<H>  4.5   |  22  |  1.62<H>    Ca    11.3<H>      04 Aug 2024 11:51    TPro  7.0  /  Alb  3.7  /  TBili  0.3  /  DBili  x   /  AST  7<L>  /  ALT  9<L>  /  AlkPhos  71  08-04          Urinalysis Basic - ( 04 Aug 2024 11:51 )    Color: x / Appearance: x / SG: x / pH: x  Gluc: 132 mg/dL / Ketone: x  / Bili: x / Urobili: x   Blood: x / Protein: x / Nitrite: x   Leuk Esterase: x / RBC: x / WBC x   Sq Epi: x / Non Sq Epi: x / Bacteria: x        Culture - Urine (collected 02 Aug 2024 15:00)  Source: Clean Catch Clean Catch (Midstream)  Final Report (03 Aug 2024 14:41):    <10,000 CFU/mL Normal Urogenital [place]        RADIOLOGY & ADDITIONAL TESTS:    Imaging Personally Reviewed:    Electrocardiogram Personally Reviewed:    COORDINATION OF CARE:  Care Discussed with Consultants/Other Providers [Y/N]:  Prior or Outpatient Records Reviewed [Y/N]:

## 2024-08-04 NOTE — PATIENT PROFILE ADULT - FALL HARM RISK - FACTORS
PATIENT NAME: YNES FRAZIER   MEDICAL RECORD NUMBER: [id number]   ACCOUNT NUMBER: [id number]   ADMIT DATE: 2018   DISCHARGE DATE:   ATTENDING PHYSICIAN: KEVIN URRUTIA MD   ROOM #:   SERVICE: [facility]                              PREOPERATIVE HISTORY AND PHYSICAL         HISTORY OF PRESENT ILLNESS:  The patient is a 49-year-old,  2, para 2,   with a history of dysfunctional uterine bleeding.  An endometrial biopsy   performed in the office, recently was consistent with a possible focal area of   simple hyperplasia.  There was no atypia noted.  The patient is scheduled for a   hysteroscopy, D and C.      PAST MEDICAL HISTORY:  The patient has been healthy.      PAST OB HISTORY:  One normal vaginal delivery and one  section.      CURRENT MEDICATIONS:  Nitrofurantoin that is taken after intercourse for   prevention of urinary tract infections.      ALLERGIES:  No known drug allergies.      SOCIAL HISTORY:  The patient is .  She works as a teacher.  She denies   any tobacco, alcohol, or illicit drug use.      PAST SURGICAL HISTORY:   section in , hysteroscopy with resection of   an endometrial polyp in .      FAMILY HISTORY:  Noncontributory.      PHYSICAL EXAMINATION:  GENERAL:  The patient appears in no acute distress.   VITAL SIGNS:  Height is 61 inches, weight is 207 pounds, blood pressure 130/78.   ABDOMEN:  Soft and nontender.   :  External genitalia appear normal.  The uterus is 6 weeks in size.  There   are no adnexal masses palpated.      ASSESSMENT AND PLAN:  The patient is a 49-year-old  2, para 2, with   dysfunctional uterine bleeding, possible focus of simple hyperplasia of the   endometrium without atypia noted on recent endometrial biopsy.  The patient is   scheduled for a hysteroscopy, D and C.  The patient was counseled regarding the   risks of infection, bleeding, possible trauma to adjacent organs, possible   uterine perforation, and the patient  Impaired gait/Weakness wishes to proceed with surgery.            _________________________________   Chanel Swan M.D. OB/GYN         CC:   Chanel Swan M.D. DC/[facility]   DD: 01/10/2018  DT: 01/10/2018   TD: 15:00  TT: 15:21   [id number]

## 2024-08-04 NOTE — PATIENT PROFILE ADULT - FALL HARM RISK - HARM RISK INTERVENTIONS

## 2024-08-05 ENCOUNTER — APPOINTMENT (OUTPATIENT)
Dept: INTERNAL MEDICINE | Facility: CLINIC | Age: 89
End: 2024-08-05

## 2024-08-05 ENCOUNTER — NON-APPOINTMENT (OUTPATIENT)
Age: 89
End: 2024-08-05

## 2024-08-05 DIAGNOSIS — F03.918 UNSPECIFIED DEMENTIA, UNSPECIFIED SEVERITY, WITH OTHER BEHAVIORAL DISTURBANCE: ICD-10-CM

## 2024-08-05 DIAGNOSIS — G93.41 METABOLIC ENCEPHALOPATHY: ICD-10-CM
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
## 2024-08-05 LAB
ADD ON TEST-SPECIMEN IN LAB: SIGNIFICANT CHANGE UP
ALBUMIN SERPL ELPH-MCNC: 3.4 G/DL — SIGNIFICANT CHANGE UP (ref 3.3–5)
ALP SERPL-CCNC: 67 U/L — SIGNIFICANT CHANGE UP (ref 40–120)
ALT FLD-CCNC: 9 U/L — LOW (ref 10–45)
ANION GAP SERPL CALC-SCNC: 13 MMOL/L — SIGNIFICANT CHANGE UP (ref 5–17)
AST SERPL-CCNC: 18 U/L — SIGNIFICANT CHANGE UP (ref 10–40)
BASOPHILS # BLD AUTO: 0.03 K/UL — SIGNIFICANT CHANGE UP (ref 0–0.2)
BASOPHILS NFR BLD AUTO: 0.5 % — SIGNIFICANT CHANGE UP (ref 0–2)
BILIRUB SERPL-MCNC: 0.4 MG/DL — SIGNIFICANT CHANGE UP (ref 0.2–1.2)
BUN SERPL-MCNC: 29 MG/DL — HIGH (ref 7–23)
CALCIUM SERPL-MCNC: 11.4 MG/DL — HIGH (ref 8.4–10.5)
CHLORIDE SERPL-SCNC: 107 MMOL/L — SIGNIFICANT CHANGE UP (ref 96–108)
CK SERPL-CCNC: 77 U/L — SIGNIFICANT CHANGE UP (ref 25–170)
CO2 SERPL-SCNC: 18 MMOL/L — LOW (ref 22–31)
CREAT SERPL-MCNC: 1.56 MG/DL — HIGH (ref 0.5–1.3)
EGFR: 31 ML/MIN/1.73M2 — LOW
EOSINOPHIL # BLD AUTO: 0.25 K/UL — SIGNIFICANT CHANGE UP (ref 0–0.5)
EOSINOPHIL NFR BLD AUTO: 3.9 % — SIGNIFICANT CHANGE UP (ref 0–6)
GLUCOSE SERPL-MCNC: 94 MG/DL — SIGNIFICANT CHANGE UP (ref 70–99)
HCT VFR BLD CALC: 34.2 % — LOW (ref 34.5–45)
HGB BLD-MCNC: 10.7 G/DL — LOW (ref 11.5–15.5)
LITHIUM SERPL-MCNC: <.2 MMOL/L — LOW (ref 0.6–1.2)
LYMPHOCYTES # BLD AUTO: 2.06 K/UL — SIGNIFICANT CHANGE UP (ref 1–3.3)
LYMPHOCYTES # BLD AUTO: 31.9 % — SIGNIFICANT CHANGE UP (ref 13–44)
MCHC RBC-ENTMCNC: 27.8 PG — SIGNIFICANT CHANGE UP (ref 27–34)
MCHC RBC-ENTMCNC: 31.3 GM/DL — LOW (ref 32–36)
MCV RBC AUTO: 88.8 FL — SIGNIFICANT CHANGE UP (ref 80–100)
MONOCYTES # BLD AUTO: 0.85 K/UL — SIGNIFICANT CHANGE UP (ref 0–0.9)
MONOCYTES NFR BLD AUTO: 13.2 % — SIGNIFICANT CHANGE UP (ref 2–14)
NEUTROPHILS # BLD AUTO: 3.27 K/UL — SIGNIFICANT CHANGE UP (ref 1.8–7.4)
NEUTROPHILS NFR BLD AUTO: 50.5 % — SIGNIFICANT CHANGE UP (ref 43–77)
NRBC # BLD: 0 /100 WBCS — SIGNIFICANT CHANGE UP (ref 0–0)
PLATELET # BLD AUTO: 204 K/UL — SIGNIFICANT CHANGE UP (ref 150–400)
POTASSIUM SERPL-MCNC: 4.9 MMOL/L — SIGNIFICANT CHANGE UP (ref 3.5–5.3)
POTASSIUM SERPL-SCNC: 4.9 MMOL/L — SIGNIFICANT CHANGE UP (ref 3.5–5.3)
PROT SERPL-MCNC: 6.5 G/DL — SIGNIFICANT CHANGE UP (ref 6–8.3)
RBC # BLD: 3.85 M/UL — SIGNIFICANT CHANGE UP (ref 3.8–5.2)
RBC # FLD: 13.9 % — SIGNIFICANT CHANGE UP (ref 10.3–14.5)
SODIUM SERPL-SCNC: 138 MMOL/L — SIGNIFICANT CHANGE UP (ref 135–145)
WBC # BLD: 6.46 K/UL — SIGNIFICANT CHANGE UP (ref 3.8–10.5)
WBC # FLD AUTO: 6.46 K/UL — SIGNIFICANT CHANGE UP (ref 3.8–10.5)

## 2024-08-05 PROCEDURE — 99233 SBSQ HOSP IP/OBS HIGH 50: CPT

## 2024-08-05 PROCEDURE — 99497 ADVNCD CARE PLAN 30 MIN: CPT

## 2024-08-05 RX ADMIN — ATORVASTATIN CALCIUM 40 MILLIGRAM(S): 40 TABLET, FILM COATED ORAL at 20:54

## 2024-08-05 RX ADMIN — Medication 6.25 MILLIGRAM(S): at 17:18

## 2024-08-05 RX ADMIN — Medication 50 MILLIGRAM(S): at 20:54

## 2024-08-05 RX ADMIN — HEPARIN SODIUM 5000 UNIT(S): 1000 INJECTION, SOLUTION INTRAVENOUS; SUBCUTANEOUS at 17:18

## 2024-08-05 RX ADMIN — HEPARIN SODIUM 5000 UNIT(S): 1000 INJECTION, SOLUTION INTRAVENOUS; SUBCUTANEOUS at 05:45

## 2024-08-05 RX ADMIN — Medication 10 MILLIGRAM(S): at 12:07

## 2024-08-05 RX ADMIN — AMLODIPINE BESYLATE 10 MILLIGRAM(S): 2.5 TABLET ORAL at 05:45

## 2024-08-05 RX ADMIN — Medication 3 MILLIGRAM(S): at 20:54

## 2024-08-05 RX ADMIN — CLOPIDOGREL BISULFATE 75 MILLIGRAM(S): 75 TABLET, FILM COATED ORAL at 12:07

## 2024-08-05 RX ADMIN — Medication 125 MICROGRAM(S): at 05:45

## 2024-08-05 RX ADMIN — Medication 1000 UNIT(S): at 12:07

## 2024-08-05 RX ADMIN — Medication 6.25 MILLIGRAM(S): at 05:45

## 2024-08-05 NOTE — PROGRESS NOTE ADULT - PROBLEM SELECTOR PLAN 9
Care plan discussed extensively with CM and HCP, Mary ([phone]).  Mary is the daughter of the patient's friend who is now . Patient has no family other than cousins who are older than she is, and thought to be living in nursing homes. Patient lived alone and had been refusing to let her 12 hour 7 day a week aides and the health care proxy into her home since last week. Plan will be global referral to [facility]. Care plan discussed extensively with SW and HCP, Mary ([phone]).  Mary is the daughter of the patient's friend who is now . Patient has no family other than cousins who are older than she is, and thought to be living in nursing homes. Patient lived alone and had been refusing to let her 12 hour 7 day a week aides and the health care proxy into her home since last week. Plan will be global referral to [facility] to which Mary is agreeable.

## 2024-08-05 NOTE — PROGRESS NOTE ADULT - PROBLEM SELECTOR PLAN 6
Troponin 103 -> 88  EKG with no ST elevations.  EF with normal LVEF and no regional WMA    Patient on Plavix at home - unclear indication. Per chart review, patient was suspected of having atrial fibrillation, however, continued on Plavix; was never started on Eliquis. Troponin 103 -> 88  EKG with no ST elevations.  EF with normal LVEF and no regional WMA    Patient on Plavix at home - unclear indication. Per chart review, patient was suspected of having atrial fibrillation, however, continued on Plavix; was never started on Eliquis. Continue Plavix for now

## 2024-08-05 NOTE — PROGRESS NOTE ADULT - SUBJECTIVE AND OBJECTIVE BOX
[facility] Division of Hospital Medicine  Becky Escobedo DO  MS Teams PREFERRED      SUBJECTIVE / OVERNIGHT EVENTS: No acute events overnight. Patient appears comfortable - no acute complaints.   ADDITIONAL REVIEW OF SYSTEMS:    MEDICATIONS  (STANDING):  amLODIPine   Tablet 10 milliGRAM(s) Oral daily  atorvastatin 40 milliGRAM(s) Oral at bedtime  carvedilol 6.25 milliGRAM(s) Oral every 12 hours  cholecalciferol 1000 Unit(s) Oral daily  citalopram 10 milliGRAM(s) Oral daily  clopidogrel Tablet 75 milliGRAM(s) Oral daily  heparin   Injectable 5000 Unit(s) SubCutaneous every 12 hours  levothyroxine 125 MICROGram(s) Oral daily  QUEtiapine 50 milliGRAM(s) Oral at bedtime    MEDICATIONS  (PRN):  acetaminophen     Tablet .. 650 milliGRAM(s) Oral every 6 hours PRN Temp greater or equal to 38C (100.4F), Mild Pain (1 - 3)  melatonin 3 milliGRAM(s) Oral at bedtime PRN Insomnia      I&O's Summary    04 Aug 2024 07:01  -  05 Aug 2024 07:00  --------------------------------------------------------  IN: 0 mL / OUT: 800 mL / NET: -800 mL        PHYSICAL EXAM:  Vital Signs Last 24 Hrs  T(C): 36.4 (05 Aug 2024 14:28), Max: 36.7 (04 Aug 2024 20:05)  T(F): 97.6 (05 Aug 2024 14:28), Max: 98.1 (05 Aug 2024 04:39)  HR: 61 (05 Aug 2024 14:28) (61 - 71)  BP: 151/76 (05 Aug 2024 14:28) (151/76 - 158/69)  BP(mean): --  RR: 18 (05 Aug 2024 14:28) (18 - 18)  SpO2: 95% (05 Aug 2024 14:28) (93% - 95%)    Parameters below as of 05 Aug 2024 14:28  Patient On (Oxygen Delivery Method): room air      CONSTITUTIONAL: NAD   EYES: Conjunctiva and sclera clear  ENMT: Moist oral mucosa, no pharyngeal injection or exudates   NECK: Supple, midline  RESPIRATORY: Normal respiratory effort; lungs are clear to auscultation bilaterally  CARDIOVASCULAR: Regular rate and rhythm, normal S1 and S2. No lower extremity edema   ABDOMEN: Nontender to palpation, no rebound/guarding  PSYCH: Calm, cooperative    LABS:                        10.7   6.46  )-----------( 204      ( 05 Aug 2024 07:48 )             34.2     08-05    138  |  107  |  29<H>  ----------------------------<  94  4.9   |  18<L>  |  1.56<H>    Ca    11.4<H>      05 Aug 2024 07:48    TPro  6.5  /  Alb  3.4  /  TBili  0.4  /  DBili  x   /  AST  18  /  ALT  9<L>  /  AlkPhos  67  08-05      CARDIAC MARKERS ( 05 Aug 2024 07:48 )  x     / x     / 77 U/L / x     / x          Urinalysis Basic - ( 05 Aug 2024 07:48 )    Color: x / Appearance: x / SG: x / pH: x  Gluc: 94 mg/dL / Ketone: x  / Bili: x / Urobili: x   Blood: x / Protein: x / Nitrite: x   Leuk Esterase: x / RBC: x / WBC x   Sq Epi: x / Non Sq Epi: x / Bacteria: x          RADIOLOGY & ADDITIONAL TESTS:  Results Reviewed:   Imaging Personally Reviewed:  Electrocardiogram Personally Reviewed:    COORDINATION OF CARE:  Care Discussed with Consultants/Other Providers [Y/N]: Y  Prior or Outpatient Records Reviewed [Y/N]: Y

## 2024-08-05 NOTE — PROGRESS NOTE ADULT - CONVERSATION DETAILS
Conversation held with HCP, Mary, via phone due to patient's baseline cognitive impairment. Consent obtained for phone conversation. Introduced self and team and role in care of patient. Reviewed events leading to patient's current status, and overall condition at present. Explained role of healthcare proxy and its role in decision making should the patient be unable to express his own wishes or lacks capacity to make medical decisions. Mary confirmed that she is documented healthcare proxy (document provided to SHABBIR, uploaded to CM, and placed in chart). Discussed treatment preferences, including chest compression and intubation. Mary reports that patient has a living will - she will review and decide regarding code status. Continue ongoing goals of care conversation.

## 2024-08-06 PROCEDURE — 99232 SBSQ HOSP IP/OBS MODERATE 35: CPT

## 2024-08-06 RX ADMIN — Medication 125 MICROGRAM(S): at 05:52

## 2024-08-06 RX ADMIN — Medication 10 MILLIGRAM(S): at 11:49

## 2024-08-06 RX ADMIN — HEPARIN SODIUM 5000 UNIT(S): 1000 INJECTION, SOLUTION INTRAVENOUS; SUBCUTANEOUS at 05:51

## 2024-08-06 RX ADMIN — CLOPIDOGREL BISULFATE 75 MILLIGRAM(S): 75 TABLET, FILM COATED ORAL at 11:49

## 2024-08-06 RX ADMIN — HEPARIN SODIUM 5000 UNIT(S): 1000 INJECTION, SOLUTION INTRAVENOUS; SUBCUTANEOUS at 17:51

## 2024-08-06 RX ADMIN — Medication 50 MILLIGRAM(S): at 23:00

## 2024-08-06 RX ADMIN — ATORVASTATIN CALCIUM 40 MILLIGRAM(S): 40 TABLET, FILM COATED ORAL at 23:08

## 2024-08-06 RX ADMIN — Medication 1000 UNIT(S): at 11:49

## 2024-08-06 RX ADMIN — AMLODIPINE BESYLATE 10 MILLIGRAM(S): 2.5 TABLET ORAL at 05:52

## 2024-08-06 RX ADMIN — Medication 6.25 MILLIGRAM(S): at 17:50

## 2024-08-06 NOTE — DIETITIAN INITIAL EVALUATION ADULT - ORAL INTAKE PTA/DIET HISTORY
pt lives alone, she orders out for food. corn flakes, grits, milk, blueberries. cheese for lunch, fish and vegetables for dinner. snacks on cookies. consumes high protein cookies. pt denies need for KOSHER diet and agrees to no pork, no ham

## 2024-08-06 NOTE — PROGRESS NOTE ADULT - SUBJECTIVE AND OBJECTIVE BOX
[facility] Division of Hospital Medicine  Becky Escobedo DO  MS Teams PREFERRED      SUBJECTIVE / OVERNIGHT EVENTS: No acute events overnight. Patient with no acute complaints.     ADDITIONAL REVIEW OF SYSTEMS:    MEDICATIONS  (STANDING):  amLODIPine   Tablet 10 milliGRAM(s) Oral daily  atorvastatin 40 milliGRAM(s) Oral at bedtime  carvedilol 6.25 milliGRAM(s) Oral every 12 hours  cholecalciferol 1000 Unit(s) Oral daily  citalopram 10 milliGRAM(s) Oral daily  clopidogrel Tablet 75 milliGRAM(s) Oral daily  heparin   Injectable 5000 Unit(s) SubCutaneous every 12 hours  levothyroxine 125 MICROGram(s) Oral daily  QUEtiapine 50 milliGRAM(s) Oral at bedtime    MEDICATIONS  (PRN):  acetaminophen     Tablet .. 650 milliGRAM(s) Oral every 6 hours PRN Temp greater or equal to 38C (100.4F), Mild Pain (1 - 3)  melatonin 3 milliGRAM(s) Oral at bedtime PRN Insomnia      I&O's Summary      PHYSICAL EXAM:  Vital Signs Last 24 Hrs  T(C): 37.2 (06 Aug 2024 14:30), Max: 37.2 (06 Aug 2024 14:30)  T(F): 99 (06 Aug 2024 14:30), Max: 99 (06 Aug 2024 14:30)  HR: 65 (06 Aug 2024 14:30) (45 - 65)  BP: 134/63 (06 Aug 2024 14:30) (134/63 - 150/84)  BP(mean): --  RR: 18 (06 Aug 2024 14:30) (18 - 18)  SpO2: 93% (06 Aug 2024 14:30) (93% - 97%)    Parameters below as of 06 Aug 2024 14:30  Patient On (Oxygen Delivery Method): room air    CONSTITUTIONAL: NAD   EYES: Conjunctiva and sclera clear  ENMT: Moist oral mucosa, no pharyngeal injection or exudates   NECK: Supple, midline  RESPIRATORY: Normal respiratory effort; lungs are clear to auscultation bilaterally  CARDIOVASCULAR: Regular rate and rhythm, normal S1 and S2. No lower extremity edema   ABDOMEN: Nontender to palpation, no rebound/guarding  PSYCH: Calm, cooperative. AAOx3     LABS:                        10.7   6.46  )-----------( 204      ( 05 Aug 2024 07:48 )             34.2     08-05    138  |  107  |  29<H>  ----------------------------<  94  4.9   |  18<L>  |  1.56<H>    Ca    11.4<H>      05 Aug 2024 07:48    TPro  6.5  /  Alb  3.4  /  TBili  0.4  /  DBili  x   /  AST  18  /  ALT  9<L>  /  AlkPhos  67  08-05      CARDIAC MARKERS ( 05 Aug 2024 07:48 )  x     / x     / 77 U/L / x     / x          Urinalysis Basic - ( 05 Aug 2024 07:48 )    Color: x / Appearance: x / SG: x / pH: x  Gluc: 94 mg/dL / Ketone: x  / Bili: x / Urobili: x   Blood: x / Protein: x / Nitrite: x   Leuk Esterase: x / RBC: x / WBC x   Sq Epi: x / Non Sq Epi: x / Bacteria: x          RADIOLOGY & ADDITIONAL TESTS:  Results Reviewed:   Imaging Personally Reviewed:  Electrocardiogram Personally Reviewed:    COORDINATION OF CARE:  Care Discussed with Consultants/Other Providers [Y/N]: Y  Prior or Outpatient Records Reviewed [Y/N]: Y

## 2024-08-06 NOTE — DIETITIAN INITIAL EVALUATION ADULT - PROBLEM SELECTOR PLAN 1
Brought in by EMS following a presumed mechanical fall at home in the setting of patient with mild cognitive impairment, reported bipolar disorder apparently is on lithium (unable to confirm from the office records, with lithium dating from 2014), essential HTN, hypothyroidism, with suspected troponin leak but also with acute kidney injury, presumably from patient's ARB.    Lithium becomes problematic with patient's azotemia and with the narrow therapeutic index, coupled with the patient's cognitive impairment and fluctuations in paranoia toward [place] (identified in office records as patient's health care surrogate but unable to confirm and no contact number available to me at present.).    Received dose of Seroquel in the ER but patient agrees.       Would consider formal Psychiatry evaluation in the AM for assistance with management with patient's medications, as well as formal assessment of capacity.    Social work.    Doubt LEFT wrist fracture on clinical grounds, but x ray ordered.

## 2024-08-06 NOTE — PROGRESS NOTE ADULT - PROBLEM SELECTOR PLAN 9
Care plan discussed extensively with SW and HCP, Mary ([phone]).  Mary is the daughter of the patient's friend who is now . Patient has no family other than cousins who are older than she is, and thought to be living in nursing homes. Patient lived alone and had been refusing to let her 12 hour 7 day a week aides and the health care proxy into her home since last week. Plan will be global referral to [facility] to which Mary is agreeable.

## 2024-08-06 NOTE — DIETITIAN INITIAL EVALUATION ADULT - WEIGHT (LBS)
Discharge instructions and prescription reviewed with pt's mother, who verbalized understanding. Pt. ambulated out in stable condition with respirations easy and unlabored. No change in pain noted upon discharge.        Andrea Seth RN  01/28/20 1247 119.9

## 2024-08-06 NOTE — DIETITIAN INITIAL EVALUATION ADULT - DIET TYPE
Vegan/DASH/TLC (sodium and cholesterol restricted diet)/soft and bite-sized/low fat/supplement (specify)

## 2024-08-06 NOTE — PROGRESS NOTE ADULT - PROBLEM SELECTOR PLAN 6
Troponin 103 -> 88  EKG with no ST elevations.  EF with normal LVEF and no regional WMA    Patient on Plavix at home - unclear indication. Per chart review, patient was suspected of having atrial fibrillation, however, continued on Plavix; was never started on Eliquis. Continue Plavix for now

## 2024-08-06 NOTE — DIETITIAN INITIAL EVALUATION ADULT - PERTINENT MEDS FT
MEDICATIONS  (STANDING):  amLODIPine   Tablet 10 milliGRAM(s) Oral daily  atorvastatin 40 milliGRAM(s) Oral at bedtime  carvedilol 6.25 milliGRAM(s) Oral every 12 hours  cholecalciferol 1000 Unit(s) Oral daily  citalopram 10 milliGRAM(s) Oral daily  clopidogrel Tablet 75 milliGRAM(s) Oral daily  heparin   Injectable 5000 Unit(s) SubCutaneous every 12 hours  levothyroxine 125 MICROGram(s) Oral daily  QUEtiapine 50 milliGRAM(s) Oral at bedtime    MEDICATIONS  (PRN):  acetaminophen     Tablet .. 650 milliGRAM(s) Oral every 6 hours PRN Temp greater or equal to 38C (100.4F), Mild Pain (1 - 3)  melatonin 3 milliGRAM(s) Oral at bedtime PRN Insomnia

## 2024-08-06 NOTE — DIETITIAN INITIAL EVALUATION ADULT - NS FNS DIET ORDER
Diet, Soft and Bite Sized:   DASH/TLC {Sodium & Cholesterol Restricted} (DASH)  No Concentrated Potassium  No Concentrated Phosphorus  Kosher  Vegan {Accepts Vegetable Products Only} (08-04-24 @ 13:35) [Active]

## 2024-08-06 NOTE — DIETITIAN INITIAL EVALUATION ADULT - PROBLEM SELECTOR PLAN 7
Transitions of Care Status:  1.  Name of PCP:    Clemente Mckay MD (PCP) [phone]  2.  PCP Contacted on Admission: [ ] Y    [ x] N    3.  PCP contacted at Discharge: [ ] Y    [ ] N    [ ] N/A  4.  Post-Discharge Appointment Date and Location:  5.  Summary of Handoff given to PCP:

## 2024-08-06 NOTE — DIETITIAN INITIAL EVALUATION ADULT - PERTINENT LABORATORY DATA
08-05    138  |  107  |  29<H>  ----------------------------<  94  4.9   |  18<L>  |  1.56<H>    Ca    11.4<H>      05 Aug 2024 07:48    TPro  6.5  /  Alb  3.4  /  TBili  0.4  /  DBili  x   /  AST  18  /  ALT  9<L>  /  AlkPhos  67  08-05

## 2024-08-07 LAB — GLUCOSE BLDC GLUCOMTR-MCNC: 141 MG/DL — HIGH (ref 70–99)

## 2024-08-07 PROCEDURE — 99232 SBSQ HOSP IP/OBS MODERATE 35: CPT

## 2024-08-07 RX ADMIN — Medication 6.25 MILLIGRAM(S): at 17:21

## 2024-08-07 RX ADMIN — HEPARIN SODIUM 5000 UNIT(S): 1000 INJECTION, SOLUTION INTRAVENOUS; SUBCUTANEOUS at 05:16

## 2024-08-07 RX ADMIN — HEPARIN SODIUM 5000 UNIT(S): 1000 INJECTION, SOLUTION INTRAVENOUS; SUBCUTANEOUS at 17:21

## 2024-08-07 RX ADMIN — Medication 125 MICROGRAM(S): at 05:15

## 2024-08-07 RX ADMIN — CLOPIDOGREL BISULFATE 75 MILLIGRAM(S): 75 TABLET, FILM COATED ORAL at 13:12

## 2024-08-07 RX ADMIN — Medication 10 MILLIGRAM(S): at 13:12

## 2024-08-07 RX ADMIN — Medication 6.25 MILLIGRAM(S): at 05:15

## 2024-08-07 RX ADMIN — Medication 1000 UNIT(S): at 13:11

## 2024-08-07 RX ADMIN — AMLODIPINE BESYLATE 10 MILLIGRAM(S): 2.5 TABLET ORAL at 05:15

## 2024-08-07 RX ADMIN — Medication 50 MILLIGRAM(S): at 22:48

## 2024-08-07 RX ADMIN — ATORVASTATIN CALCIUM 40 MILLIGRAM(S): 40 TABLET, FILM COATED ORAL at 22:48

## 2024-08-07 NOTE — PROGRESS NOTE ADULT - SUBJECTIVE AND OBJECTIVE BOX
[facility] Division of Hospital Medicine  Becky Escobedo DO MS Teams PREFERRED      SUBJECTIVE / OVERNIGHT EVENTS: No acute events overnight. Patient with no acute complaints. Calm, cooperative.    ADDITIONAL REVIEW OF SYSTEMS:    MEDICATIONS  (STANDING):  amLODIPine   Tablet 10 milliGRAM(s) Oral daily  atorvastatin 40 milliGRAM(s) Oral at bedtime  carvedilol 6.25 milliGRAM(s) Oral every 12 hours  cholecalciferol 1000 Unit(s) Oral daily  citalopram 10 milliGRAM(s) Oral daily  clopidogrel Tablet 75 milliGRAM(s) Oral daily  heparin   Injectable 5000 Unit(s) SubCutaneous every 12 hours  levothyroxine 125 MICROGram(s) Oral daily  QUEtiapine 50 milliGRAM(s) Oral at bedtime    MEDICATIONS  (PRN):  acetaminophen     Tablet .. 650 milliGRAM(s) Oral every 6 hours PRN Temp greater or equal to 38C (100.4F), Mild Pain (1 - 3)  melatonin 3 milliGRAM(s) Oral at bedtime PRN Insomnia      I&O's Summary    06 Aug 2024 07:01  -  07 Aug 2024 07:00  --------------------------------------------------------  IN: 0 mL / OUT: 600 mL / NET: -600 mL        PHYSICAL EXAM:  Vital Signs Last 24 Hrs  T(C): 36.6 (07 Aug 2024 12:41), Max: 36.7 (06 Aug 2024 20:00)  T(F): 97.9 (07 Aug 2024 12:41), Max: 98.1 (06 Aug 2024 20:00)  HR: 64 (07 Aug 2024 12:41) (57 - 64)  BP: 146/75 (07 Aug 2024 12:41) (129/71 - 153/81)  BP(mean): --  RR: 18 (07 Aug 2024 12:41) (18 - 18)  SpO2: 96% (07 Aug 2024 12:41) (92% - 96%)    Parameters below as of 07 Aug 2024 12:41  Patient On (Oxygen Delivery Method): room air      CONSTITUTIONAL: NAD   EYES: Conjunctiva and sclera clear  ENMT: Moist oral mucosa, no pharyngeal injection or exudates   NECK: Supple, midline  RESPIRATORY: Normal respiratory effort; lungs are clear to auscultation bilaterally  CARDIOVASCULAR: Regular rate and rhythm, normal S1 and S2. No lower extremity edema   ABDOMEN: Nontender to palpation, no rebound/guarding  PSYCH: Calm, cooperative. AAOx3     LABS:                      RADIOLOGY & ADDITIONAL TESTS:  Results Reviewed:   Imaging Personally Reviewed:  Electrocardiogram Personally Reviewed:    COORDINATION OF CARE:  Care Discussed with Consultants/Other Providers [Y/N]: Y  Prior or Outpatient Records Reviewed [Y/N]: Y

## 2024-08-07 NOTE — PROGRESS NOTE ADULT - REASON FOR ADMISSION
Brought by EMS for presumed mechanical fall at home.

## 2024-08-07 NOTE — PROGRESS NOTE ADULT - ASSESSMENT
Brought in by EMS following a presumed mechanical fall at home in the setting of patient with mild cognitive impairment, reported bipolar disorder,  essential HTN, hypothyroidism, with suspected troponin leak but also with acute kidney injury    
  Brought in by EMS following a presumed mechanical fall at home in the setting of patient with mild cognitive impairment, reported bipolar disorder apparently is on lithium (unable to confirm from the office records, with lithium dating from 2014), essential HTN, hypothyroidism, with suspected troponin leak but also with acute kidney injury, presumably from patient's ARB.    Lithium becomes problematic with patient's azotemia and with the narrow therapeutic index, coupled with the patient's cognitive impairment and fluctuations in paranoia toward Hanita (identified in office records as patient's health care surrogate but unable to confirm and no contact number available to me at present.).    Received dose of Seroquel in the ER but patient agrees to Psych evaluation this AM>     Social work consulted.   
Brought in by EMS following a presumed mechanical fall at home in the setting of patient with mild cognitive impairment, reported bipolar disorder,  essential HTN, hypothyroidism, with suspected troponin leak but also with acute kidney injury    
  Brought in by EMS following a presumed mechanical fall at home in the setting of patient with mild cognitive impairment, reported bipolar disorder apparently is on lithium ,  essential HTN, hypothyroidism, with suspected troponin leak but also with acute kidney injury    
Brought in by EMS following a presumed mechanical fall at home in the setting of patient with mild cognitive impairment, reported bipolar disorder,  essential HTN, hypothyroidism, with suspected troponin leak but also with acute kidney injury    
  Brought in by EMS following a presumed mechanical fall at home in the setting of patient with mild cognitive impairment, reported bipolar disorder apparently is on lithium (unable to confirm from the office records, with lithium dating from 2014), essential HTN, hypothyroidism, with suspected troponin leak but also with acute kidney injury, presumably from patient's ARB.    Lithium becomes problematic with patient's azotemia and with the narrow therapeutic index, coupled with the patient's cognitive impairment and fluctuations in paranoia toward [place] (identified in office records as patient's health care surrogate but unable to confirm and no contact number available to me at present.).    Received dose of Seroquel in the ER but patient agrees to Psych evaluation this AM>     Social work consulted.

## 2024-08-07 NOTE — PROGRESS NOTE ADULT - PROBLEM SELECTOR PROBLEM 6
Need for prophylactic measure
Elevated troponin

## 2024-08-07 NOTE — PROGRESS NOTE ADULT - PROBLEM SELECTOR PLAN 4
Continue Norvasc and Coreg   Monitor BP and HR
telemetry.   Suspect troponin leak--> stable Delta Trop    Limited echo with stable EF .  ON Plavix by PCP
telemetry.   Suspect troponin leak--> stable Delta Trop    Limited echo.  ON Plavix by PCP
Continue Norvasc and Coreg   Monitor BP and HR
Upgraded to telemetry.   Suspect troponin leak.   Limited echo.  ON Plavix by PCP
Continue Norvasc and Coreg   Monitor BP and HR

## 2024-08-07 NOTE — PROGRESS NOTE ADULT - PROBLEM SELECTOR PROBLEM 7
Hypothyroidism
Discharge planning issues
Discharge planning issues
Hypothyroidism
Discharge planning issues
Hypothyroidism

## 2024-08-07 NOTE — PROGRESS NOTE ADULT - PROBLEM SELECTOR PLAN 7
TSH 0.69  Continue Synthroid.
Transitions of Care Status:  Pending SW   1.  Name of PCP:    Clemente Mckay MD (PCP) [phone]  2.  PCP Contacted on Admission: [ ] Y    [ x] N    3.  PCP contacted at Discharge: [ ] Y    [ ] N    [ ] N/A
Transitions of Care Status:  Pending SW   1.  Name of PCP:    Clemente Mckay MD (PCP) [phone]  2.  PCP Contacted on Admission: [ ] Y    [ x] N    3.  PCP contacted at Discharge: [ ] Y    [ ] N    [ ] N/A  4.  Post-Discharge Appointment Date and Location:  5.  Summary of Handoff given to PCP:
Transitions of Care Status:  Pending SW   1.  Name of PCP:    Clemente Mckay MD (PCP) [phone]  2.  PCP Contacted on Admission: [ ] Y    [ x] N    3.  PCP contacted at Discharge: [ ] Y    [ ] N    [ ] N/A  4.  Post-Discharge Appointment Date and Location:  5.  Summary of Handoff given to PCP:
TSH 0.69  Continue Synthroid.
TSH 0.69  Continue Synthroid.

## 2024-08-07 NOTE — PROGRESS NOTE ADULT - PROBLEM SELECTOR PLAN 3
No traumatic findings on CTH, CXR and XR Pelvis  Pending XR bilateral wrists - patient refusing XRs
Unsure of her baseline but FENa is 1.4   HOLD ARB due to acute kidney injury.      F/up Renal US>F/up AM labs  S/P IV bolus--> repeat Cr in PM   hold nephrotoxic agents
No traumatic findings on CTH, CXR and XR Pelvis  Pending XR bilateral wrists
No traumatic findings on CTH, CXR and XR Pelvis  Pending XR bilateral wrists - patient refusing XRs
HOLD ARB due to acute kidney injury.    Renal US>F/up AM labs
HOLD ARB due to acute kidney injury.    Renal US>

## 2024-08-07 NOTE — PROGRESS NOTE ADULT - NSPROGADDITIONALINFOA_GEN_ALL_CORE
F/up AM Cr , Stable Lithium levels       Tricia Strickland\[facility]\""rohan
Time-based billing (NON-critical care).     45 minutes spent on total encounter. The necessity of the time spent during the encounter on this date of service was due to:     - Reviewing, and interpreting labs, testing, and imaging.  - Independently obtaining a review of systems and performing a physical exam  - Reviewing prior records and where necessary, outpatient records.  - Counselling and educating patient regarding interpretation of aforementioned items and plan of care.      d/w ACP
Time-based billing (NON-critical care).     46 minutes spent on total encounter. The necessity of the time spent during the encounter on this date of service was due to:     - Reviewing, and interpreting labs, testing, and imaging.  - Independently obtaining a review of systems and performing a physical exam  - Reviewing prior records and where necessary, outpatient records.  - Counselling and educating patient regarding interpretation of aforementioned items and plan of care.      d/w ACP
Tricia Corado   HOspitalist
Time-based billing (NON-critical care).     55 minutes spent on total encounter. The necessity of the time spent during the encounter on this date of service was due to:     - Reviewing, and interpreting labs, testing, and imaging.  - Independently obtaining a review of systems and performing a physical exam  - Reviewing prior records and where necessary, outpatient records.  - Counselling and educating patient regarding interpretation of aforementioned items and plan of care.      d/w ACP

## 2024-08-07 NOTE — PROGRESS NOTE ADULT - PROBLEM SELECTOR PROBLEM 4
Elevated troponin
Essential hypertension
Elevated troponin
Essential hypertension
Essential hypertension
Elevated troponin

## 2024-08-07 NOTE — PROGRESS NOTE ADULT - PROBLEM SELECTOR PLAN 8
Pharmacologic DVT prophylaxis- Heparin

## 2024-08-07 NOTE — PROGRESS NOTE ADULT - PROBLEM SELECTOR PROBLEM 1
Metabolic encephalopathy
Confusional state
Metabolic encephalopathy
Metabolic encephalopathy

## 2024-08-07 NOTE — PROGRESS NOTE ADULT - PROBLEM SELECTOR PROBLEM 2
Dementia with behavioral disturbance
Dementia with behavioral disturbance
Essential hypertension
Essential hypertension
Dementia with behavioral disturbance
Essential hypertension

## 2024-08-07 NOTE — PROGRESS NOTE ADULT - PROBLEM SELECTOR PLAN 9
HCP, Mary ([phone]).  Mary is the daughter of the patient's friend who is now . Patient has no family other than cousins who are older than she is, and thought to be living in nursing homes. Patient lived alone and had been refusing to let her 12 hour 7 day a week aides and the health care proxy into her home since last week. Plan will be global referral to [facility] to which Mary is agreeable.

## 2024-08-07 NOTE — PROGRESS NOTE ADULT - PROBLEM SELECTOR PLAN 2
Continue  Norvasc and Coreg   Monitor BP and HR
Continue  Norvasc and Coreg for HTN.
Received Seroquel in ED  Frequent reassurance and verbal orientation  Family members or other familiar persons by bedside  Monitor for constipation, urinary retention, pain, hunger, thirst etc.  Promote sleep wake cycle and reorientation as indicated  Minimize use of benzodiazepines, opioids, anticholinergics and other deliriogenic agents whenever possible.  c/w home citalopram and Seroquel.  Per HCP and per chart review, patient was NOT on Lithium. Lithium was stopped because her creatinine was elevated
Received Seroquel in ED  Frequent reassurance and verbal orientation  Family members or other familiar persons by bedside  Monitor for constipation, urinary retention, pain, hunger, thirst etc.  Promote sleep wake cycle and reorientation as indicated  Minimize use of benzodiazepines, opioids, anticholinergics and other deliriogenic agents whenever possible.  c/w home citalopram and Seroquel.  Per HCP and per chart review, patient was NOT on Lithium. Lithium was stopped because her creatinine was elevated
Continue  Norvasc and Coreg for HTN.
Received Seroquel in ED  Frequent reassurance and verbal orientation  Family members or other familiar persons by bedside  Monitor for constipation, urinary retention, pain, hunger, thirst etc.  Promote sleep wake cycle and reorientation as indicated  Minimize use of benzodiazepines, opioids, anticholinergics and other deliriogenic agents whenever possible.  c/w home citalopram and Seroquel.  Per HCP and per chart review, patient was NOT on Lithium. Lithium was stopped because her creatinine was elevated

## 2024-08-07 NOTE — PROGRESS NOTE ADULT - PROBLEM SELECTOR PLAN 1
No sign of active infection  -afebrile, no leukocytosis, UClx with normal  stacie, CXR with no focal consolidation, RVP Neg.   CTH without acute finding.  Likely 2/2 HERON   Patient is now calm, cooperative.
No sign of active infection  -afebrile, no leukocytosis, UClx with normal  stacie, CXR with no focal consolidation, RVP Neg.   CTH without acute finding.  Likely 2/2 HERON   Patient is now calm, cooperative.
Received dose of Seroquel in the ER but patient agrees     Psych rec is noted --> restart home Psch meds ( it is very hard to confirm her home meds --> per inpt [facility]  patient was on Lithium , but I reviewed HIE and I could not see the Lithium --> might consider reaching out to her PCP or oupt psych during the weekdays   Will hold Lithium for now   Social work consulted.
Received dose of Seroquel in the ER but patient agrees     Psych rec is noted --> restart home [facility] meds   Social work consulted.
Received dose of Seroquel in the ER but patient agrees    Pending Psych  Followup workup  Social work consulted.   Doubt LEFT wrist fracture on clinical grounds, but x ray ordered.
No sign of active infection  -afebrile, no leukocytosis, UClx with normal  stacie, CXR with no focal consolidation, RVP Neg.   CTH without acute finding.  Likely 2/2 HERON   Patient is now calm, cooperative.

## 2024-08-07 NOTE — PROGRESS NOTE ADULT - PROBLEM SELECTOR PROBLEM 5
Hypothyroidism
HERON (acute kidney injury)
Hypothyroidism
HERON (acute kidney injury)
Hypothyroidism
HERON (acute kidney injury)

## 2024-08-07 NOTE — PROGRESS NOTE ADULT - PROBLEM SELECTOR PLAN 5
TSH 0.69  Continue Synthroid.
TSH 0.69  Continue Synthroid.
Unsure of her baseline - Cre >2 on outpatient labs.   1.4 % FENa Indeterminate  Initial Cre 1.94, improved with fluids  HOLD ARB due to possible acute kidney injury.      Avoid nephrotoxic medications  Encourage oral intake   Check renal US - patient refusing US   Continue to monitor
TSH 0.69  Continue Synthroid.
Unsure of her baseline - Cre >2 on outpatient labs.   1.4 % FENa Indeterminate  Initial Cre 1.94, improved with fluids  HOLD ARB due to possible acute kidney injury.      Avoid nephrotoxic medications  Encourage oral intake   Check renal US  Continue to monitor
Unsure of her baseline - Cre >2 on outpatient labs.   1.4 % FENa Indeterminate  Initial Cre 1.94, improved with fluids  HOLD ARB due to possible acute kidney injury.      Avoid nephrotoxic medications  Encourage oral intake   Check renal US - patient refusing US   Continue to monitor

## 2024-08-08 ENCOUNTER — TRANSCRIPTION ENCOUNTER (OUTPATIENT)
Age: 89
End: 2024-08-08

## 2024-08-08 VITALS
RESPIRATION RATE: 18 BRPM | TEMPERATURE: 97 F | OXYGEN SATURATION: 95 % | DIASTOLIC BLOOD PRESSURE: 76 MMHG | SYSTOLIC BLOOD PRESSURE: 167 MMHG | HEART RATE: 66 BPM

## 2024-08-08 LAB
ANION GAP SERPL CALC-SCNC: 10 MMOL/L — SIGNIFICANT CHANGE UP (ref 5–17)
BUN SERPL-MCNC: 30 MG/DL — HIGH (ref 7–23)
CALCIUM SERPL-MCNC: 11.2 MG/DL — HIGH (ref 8.4–10.5)
CHLORIDE SERPL-SCNC: 106 MMOL/L — SIGNIFICANT CHANGE UP (ref 96–108)
CO2 SERPL-SCNC: 21 MMOL/L — LOW (ref 22–31)
CREAT SERPL-MCNC: 1.5 MG/DL — HIGH (ref 0.5–1.3)
EGFR: 33 ML/MIN/1.73M2 — LOW
GLUCOSE SERPL-MCNC: 96 MG/DL — SIGNIFICANT CHANGE UP (ref 70–99)
POTASSIUM SERPL-MCNC: 4.9 MMOL/L — SIGNIFICANT CHANGE UP (ref 3.5–5.3)
POTASSIUM SERPL-SCNC: 4.9 MMOL/L — SIGNIFICANT CHANGE UP (ref 3.5–5.3)
SODIUM SERPL-SCNC: 137 MMOL/L — SIGNIFICANT CHANGE UP (ref 135–145)

## 2024-08-08 PROCEDURE — 81001 URINALYSIS AUTO W/SCOPE: CPT

## 2024-08-08 PROCEDURE — 85025 COMPLETE CBC W/AUTO DIFF WBC: CPT

## 2024-08-08 PROCEDURE — 84100 ASSAY OF PHOSPHORUS: CPT

## 2024-08-08 PROCEDURE — 36415 COLL VENOUS BLD VENIPUNCTURE: CPT

## 2024-08-08 PROCEDURE — 87637 SARSCOV2&INF A&B&RSV AMP PRB: CPT

## 2024-08-08 PROCEDURE — 97110 THERAPEUTIC EXERCISES: CPT

## 2024-08-08 PROCEDURE — 82550 ASSAY OF CK (CPK): CPT

## 2024-08-08 PROCEDURE — 80048 BASIC METABOLIC PNL TOTAL CA: CPT

## 2024-08-08 PROCEDURE — 71045 X-RAY EXAM CHEST 1 VIEW: CPT

## 2024-08-08 PROCEDURE — 83735 ASSAY OF MAGNESIUM: CPT

## 2024-08-08 PROCEDURE — 93306 TTE W/DOPPLER COMPLETE: CPT

## 2024-08-08 PROCEDURE — 84540 ASSAY OF URINE/UREA-N: CPT

## 2024-08-08 PROCEDURE — 80178 ASSAY OF LITHIUM: CPT

## 2024-08-08 PROCEDURE — 84300 ASSAY OF URINE SODIUM: CPT

## 2024-08-08 PROCEDURE — 99285 EMERGENCY DEPT VISIT HI MDM: CPT | Mod: 25

## 2024-08-08 PROCEDURE — 70450 CT HEAD/BRAIN W/O DYE: CPT | Mod: MC

## 2024-08-08 PROCEDURE — 97162 PT EVAL MOD COMPLEX 30 MIN: CPT

## 2024-08-08 PROCEDURE — 87086 URINE CULTURE/COLONY COUNT: CPT

## 2024-08-08 PROCEDURE — 84443 ASSAY THYROID STIM HORMONE: CPT

## 2024-08-08 PROCEDURE — 82570 ASSAY OF URINE CREATININE: CPT

## 2024-08-08 PROCEDURE — 72170 X-RAY EXAM OF PELVIS: CPT

## 2024-08-08 PROCEDURE — 99239 HOSP IP/OBS DSCHRG MGMT >30: CPT

## 2024-08-08 PROCEDURE — 97530 THERAPEUTIC ACTIVITIES: CPT

## 2024-08-08 PROCEDURE — 82962 GLUCOSE BLOOD TEST: CPT

## 2024-08-08 PROCEDURE — 80053 COMPREHEN METABOLIC PANEL: CPT

## 2024-08-08 PROCEDURE — 84484 ASSAY OF TROPONIN QUANT: CPT

## 2024-08-08 RX ORDER — LOSARTAN POTASSIUM 50 MG/1
1 TABLET, FILM COATED ORAL
Refills: 0 | DISCHARGE

## 2024-08-08 RX ADMIN — Medication 1000 UNIT(S): at 11:02

## 2024-08-08 RX ADMIN — HEPARIN SODIUM 5000 UNIT(S): 1000 INJECTION, SOLUTION INTRAVENOUS; SUBCUTANEOUS at 06:03

## 2024-08-08 RX ADMIN — AMLODIPINE BESYLATE 10 MILLIGRAM(S): 2.5 TABLET ORAL at 06:03

## 2024-08-08 RX ADMIN — Medication 6.25 MILLIGRAM(S): at 06:02

## 2024-08-08 RX ADMIN — Medication 10 MILLIGRAM(S): at 11:02

## 2024-08-08 RX ADMIN — Medication 125 MICROGRAM(S): at 06:02

## 2024-08-08 RX ADMIN — CLOPIDOGREL BISULFATE 75 MILLIGRAM(S): 75 TABLET, FILM COATED ORAL at 11:02

## 2024-08-08 NOTE — DISCHARGE NOTE NURSING/CASE MANAGEMENT/SOCIAL WORK - NSDCPEFALRISK_GEN_ALL_CORE
For information on Fall & Injury Prevention, visit: [email or web address] OR  [email or web address] OR  https://www.cdc.gov/steadi/patient.html

## 2024-08-08 NOTE — SWALLOW BEDSIDE ASSESSMENT ADULT - COMMENTS
8/6; Dietary consult - pt lives alone, she orders out for food. corn flakes, grits, milk, blueberries. cheese for lunch, fish and vegetables for dinner. snacks on cookies. consumes high protein cookies. pt denies need for KOSHER diet and agrees to no pork, no ham. Denies difficulty swallowing.    SWALLOW HISTORY: No reports in SCM or in PACS prior to this admission.

## 2024-08-08 NOTE — DISCHARGE NOTE PROVIDER - CARE PROVIDERS DIRECT ADDRESSES
[email or web address]

## 2024-08-08 NOTE — DISCHARGE NOTE NURSING/CASE MANAGEMENT/SOCIAL WORK - PATIENT PORTAL LINK FT
You can access the FollowMyHealth Patient Portal offered by [facility] by registering at the following website: [email or web address]. By joining [facility]’s FollowMyHealth portal, you will also be able to view your health information using other applications (apps) compatible with our system.

## 2024-08-08 NOTE — SWALLOW BEDSIDE ASSESSMENT ADULT - SLP PERTINENT HISTORY OF CURRENT PROBLEM
Pt is a 89 year old female with PMH significant for bipolar disorder on lithium, HTN, HLD, CKD, hypothyroidism.  Pt presents  s/p fall. Brought in by EMS following a presumed mechanical fall at home in the setting of patient with mild cognitive impairment, reported bipolar disorder, essential HTN, hypothyroidism, with suspected troponin leak but also with acute kidney injury

## 2024-08-08 NOTE — DISCHARGE NOTE PROVIDER - ATTENDING DISCHARGE PHYSICAL EXAMINATION:
Vital Signs Last 24 Hrs  T(C): 36.6 (08 Aug 2024 04:46), Max: 36.8 (07 Aug 2024 16:41)  T(F): 97.8 (08 Aug 2024 04:46), Max: 98.3 (07 Aug 2024 21:25)  HR: 60 (08 Aug 2024 04:46) (57 - 64)  BP: 123/66 (08 Aug 2024 04:46) (123/66 - 160/74)  BP(mean): --  RR: 18 (08 Aug 2024 04:46) (18 - 18)  SpO2: 96% (08 Aug 2024 04:46) (94% - 96%)    Parameters below as of 08 Aug 2024 04:46  Patient On (Oxygen Delivery Method): room air    CONSTITUTIONAL: NAD   EYES: Conjunctiva and sclera clear  ENMT: Moist oral mucosa, no pharyngeal injection or exudates   NECK: Supple, midline  RESPIRATORY: Normal respiratory effort; lungs are clear to auscultation bilaterally  CARDIOVASCULAR: Regular rate and rhythm, normal S1 and S2. No lower extremity edema   ABDOMEN: Nontender to palpation, no rebound/guarding  PSYCH: Calm, cooperative. AAOx3

## 2024-08-08 NOTE — DISCHARGE NOTE NURSING/CASE MANAGEMENT/SOCIAL WORK - NSTRANSFEREYEGLASSESPAIRS_GEN_A_NUR
Pt scheduled on 5/12 for 3 month follow up , pt over the age of 60. Ok to keep as scheduled or should she be moved out?   
1 pair

## 2024-08-08 NOTE — DISCHARGE NOTE PROVIDER - NSFOLLOWUPCLINICS_GEN_ALL_ED_FT
[facility]  Psychiatry  [address] [place] [address]  Phone: [phone]  Fax:

## 2024-08-08 NOTE — DISCHARGE NOTE PROVIDER - NSDCCPCAREPLAN_GEN_ALL_CORE_FT
PRINCIPAL DISCHARGE DIAGNOSIS  Diagnosis: Frequent falls  Assessment and Plan of Treatment: You were admitted due to falls.   CT of head, XR of chest and pelvis showed no traumatic injuries.  Continue fall precautions (e.g. ambulate with assistive device, remove rugs, cords, clutter, and furniture from walkways and stairs. keep floors dry. keep your house well lit, use night-lights in hallways and bathrooms.)  If any new or worsening symptoms, such as fever, chills, chest pain, dyspnea, nausea, vomit, abdominal pain, seek immediate medical attention.      SECONDARY DISCHARGE DIAGNOSES  Diagnosis: HERON (acute kidney injury)  Assessment and Plan of Treatment: You had acute kidney injury, likely due to dehydration. Your kidney function improved with fluids.  Continue to stay well-hydrated.  Avoid non-steroidal inflammatory agents such as ibuprofen, Motrin, Aleve.

## 2024-08-08 NOTE — DISCHARGE NOTE PROVIDER - NSDCMRMEDTOKEN_GEN_ALL_CORE_FT
amLODIPine 10 mg oral tablet: 1 tab(s) orally once a day  atorvastatin 40 mg oral tablet: 1 tab(s) orally once a day (at bedtime)  carvedilol 6.25 mg oral tablet: 1 tab(s) orally 2 times a day  cholecalciferol oral tablet: 1000 unit(s) orally once a day  citalopram 10 mg oral tablet: 1 tab(s) orally once a day  Plavix 75 mg oral tablet: 1 tab(s) orally once a day  QUEtiapine 50 mg oral tablet: 1 tab(s) orally once a day (at bedtime)  Synthroid 125 mcg (0.125 mg) oral tablet: 1 tab(s) orally once a day

## 2024-08-08 NOTE — DISCHARGE NOTE PROVIDER - HOSPITAL COURSE
HPI:  NIGHT HOSPITALIST:    Patient UNKNOWN to me previously, assigned to me at this point via the ER--patient followed by her PCP above--limited history from patient--office records from Dr. Mckay reviewed by me--patient is a 89 y/o retired  from the [employer] who lives alone but apparently has had intermittent home aides--review of office records with patient accompanied by a friend, Charlesjudith, during office visits (last to Dr. Mckay on 2024) with patient reported on office note to have episodes of agitation, fluctuating with confusion, with apparently a deterioration in self care--patient with a history of cognitive impairment, reported bipolar on lithium, essential HTN, hypothyroidism--with office notes from Dr. Mckay from 24 and 24 with patient reported by Mary to be more agitated in the past week, apparently patient had contacted the police twice with patient thinking the aides are "stealing from her", with patient reporting to me that she has had a falling out with Charlesjudith (unclear to patient's Health Care Proxy or Power of ) with patient reporting she has a cousin in [place] and relatives in [facility].   Patient reports she slipped at home and was unable to get off the ground and crawled on the floor to call 911.    Patient denies HA< focal weakness.   NO hip pain, no wrist pain.   NO LOC.   NO chest pain/pressure.   NO palpitations.   NO N/V.   NO back pain, no tearing back pain.   NO fever, no chills, no rigors.    Patient with a hospitalization at [facility] in May 2023 with a course at [facility] and an admission at [facility] in 2014 for a confusional state.    Patient with episode of agitation earlier in the ER, receiving Seroquel.  Now calm.   (01 Aug 2024 23:48)    Hospital Course:  There was no sign of active infection - patient was afebrile, no leukocytosis, UClx with normal  stacie, CXR with no focal consolidation, RVP Neg.   CTH without acute finding. Patient likely with metabolic encephalopathy 2/2 HERON in setting of dementia. Cre improved with fluids. Patient deferred renal US.   Patient is now calm, cooperative.    She had elevated troponins. EKG without ST elevations. TTE with normal LVEF and no regional WMA.     Due to reported falls, CT, CXR and XR Pelvis - No traumatic injuries noted. Patient deferred XR bilateral wrists.     Due to HERON and as her BP was controlled, Losartan was discontinued.      Patient was evaluated by psychiatry - no psychiatric contraindication to discharge. She was continued on Seroquel QHS and citalopram. Per HCP and per chart review, patient was NOT on Lithium. Lithium was stopped because her creatinine was elevated.     Due to fluctuating mentation, paranoia and possible cognitive impairment, her HCP, Mary ([phone]), assisted with discharge planning. Mary is the daughter of the patient's friend who is now . Patient has no family other than cousins who are older than she is, and thought to be living in nursing homes. Patient lived alone and had been refusing to let her 12 hour 7 day a week aides and the health care proxy into her home since last week. Plan will be global referral to [facility] to which Mary is agreeable. Patient symptomatically improved and hemodynamically stable for discharge.     Important Medication Changes and Reason:  Discontinue Losartan due to HERON. Monitor BP.    Active or Pending Issues Requiring Follow-up:  Follow-up with PCP     Advanced Directives:   [X] Full code  [ ] DNR  [ ] Hospice    Discharge Diagnoses:  Acute metabolic encephalopathy  Dementia with behavioral disturbance.   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

## 2024-08-08 NOTE — DISCHARGE NOTE PROVIDER - CARE PROVIDER_API CALL
Clemente Mckay  Internal Medicine  [address], [address]  [place] [address]  Phone: [phone]  Fax: [phone]  Established Patient  Follow Up Time: 2 weeks

## 2024-08-08 NOTE — SWALLOW BEDSIDE ASSESSMENT ADULT - SWALLOW EVAL: DIAGNOSIS
Consult received and appreciated. Chart reviewed. Upon encounter, pt discharged to [facility].

## 2024-08-10 ENCOUNTER — NON-APPOINTMENT (OUTPATIENT)
Age: 89
End: 2024-08-10

## 2024-12-23 ENCOUNTER — RX RENEWAL (OUTPATIENT)
Age: 88
End: 2024-12-23

## 2025-02-06 ENCOUNTER — RX RENEWAL (OUTPATIENT)
Age: 89
End: 2025-02-06

## 2025-05-17 ENCOUNTER — RX RENEWAL (OUTPATIENT)
Age: 89
End: 2025-05-17

## 2025-08-26 ENCOUNTER — RX RENEWAL (OUTPATIENT)
Age: 89
End: 2025-08-26